# Patient Record
Sex: MALE | Race: WHITE | Employment: OTHER | URBAN - METROPOLITAN AREA
[De-identification: names, ages, dates, MRNs, and addresses within clinical notes are randomized per-mention and may not be internally consistent; named-entity substitution may affect disease eponyms.]

---

## 2017-01-05 ENCOUNTER — OFFICE VISIT (OUTPATIENT)
Dept: INTERNAL MEDICINE | Age: 77
End: 2017-01-05

## 2017-01-05 VITALS
WEIGHT: 193.2 LBS | TEMPERATURE: 98.4 F | RESPIRATION RATE: 16 BRPM | HEART RATE: 69 BPM | BODY MASS INDEX: 27.66 KG/M2 | HEIGHT: 70 IN | OXYGEN SATURATION: 94 % | DIASTOLIC BLOOD PRESSURE: 70 MMHG | SYSTOLIC BLOOD PRESSURE: 148 MMHG

## 2017-01-05 DIAGNOSIS — C61 PROSTATE CANCER (HCC): ICD-10-CM

## 2017-01-05 DIAGNOSIS — I10 ESSENTIAL HYPERTENSION: Primary | ICD-10-CM

## 2017-01-05 DIAGNOSIS — M19.90 ARTHRITIS: ICD-10-CM

## 2017-01-05 DIAGNOSIS — E55.9 HYPOVITAMINOSIS D: ICD-10-CM

## 2017-01-05 LAB
ANION GAP SERPL CALCULATED.3IONS-SCNC: 13 MEQ/L (ref 7–13)
BUN BLDV-MCNC: 22 MG/DL (ref 8–23)
CALCIUM SERPL-MCNC: 9.3 MG/DL (ref 8.6–10.2)
CHLORIDE BLD-SCNC: 101 MEQ/L (ref 98–107)
CO2: 25 MEQ/L (ref 22–29)
CREAT SERPL-MCNC: 1.27 MG/DL (ref 0.7–1.2)
GFR AFRICAN AMERICAN: >60
GFR NON-AFRICAN AMERICAN: 54.7
GLUCOSE BLD-MCNC: 104 MG/DL (ref 74–109)
POTASSIUM SERPL-SCNC: 4.8 MEQ/L (ref 3.5–5.1)
SODIUM BLD-SCNC: 139 MEQ/L (ref 132–144)
VITAMIN D 25-HYDROXY: 70.6 NG/ML (ref 30–100)

## 2017-01-05 PROCEDURE — 99214 OFFICE O/P EST MOD 30 MIN: CPT | Performed by: FAMILY MEDICINE

## 2017-01-05 ASSESSMENT — PATIENT HEALTH QUESTIONNAIRE - PHQ9
SUM OF ALL RESPONSES TO PHQ QUESTIONS 1-9: 0
2. FEELING DOWN, DEPRESSED OR HOPELESS: 0
SUM OF ALL RESPONSES TO PHQ9 QUESTIONS 1 & 2: 0
1. LITTLE INTEREST OR PLEASURE IN DOING THINGS: 0

## 2017-01-06 DIAGNOSIS — N18.30 CRF (CHRONIC RENAL FAILURE), STAGE 3 (MODERATE) (HCC): Primary | ICD-10-CM

## 2017-01-06 PROBLEM — N18.9 CRF (CHRONIC RENAL FAILURE): Status: ACTIVE | Noted: 2017-01-06

## 2017-01-17 ENCOUNTER — HOSPITAL ENCOUNTER (OUTPATIENT)
Dept: ULTRASOUND IMAGING | Age: 77
Discharge: HOME | End: 2017-01-17
Payer: MEDICARE

## 2017-01-17 DIAGNOSIS — N18.30 CRF (CHRONIC RENAL FAILURE), STAGE 3 (MODERATE) (HCC): ICD-10-CM

## 2017-01-17 PROCEDURE — 76775 US EXAM ABDO BACK WALL LIM: CPT

## 2017-04-12 ENCOUNTER — OFFICE VISIT (OUTPATIENT)
Dept: INTERNAL MEDICINE | Age: 77
End: 2017-04-12

## 2017-04-12 VITALS
TEMPERATURE: 98.1 F | WEIGHT: 194 LBS | RESPIRATION RATE: 16 BRPM | SYSTOLIC BLOOD PRESSURE: 140 MMHG | OXYGEN SATURATION: 95 % | HEART RATE: 57 BPM | HEIGHT: 70 IN | DIASTOLIC BLOOD PRESSURE: 76 MMHG | BODY MASS INDEX: 27.77 KG/M2

## 2017-04-12 DIAGNOSIS — I10 ESSENTIAL HYPERTENSION: Primary | ICD-10-CM

## 2017-04-12 DIAGNOSIS — E79.0 HYPERURICEMIA: ICD-10-CM

## 2017-04-12 PROCEDURE — 4040F PNEUMOC VAC/ADMIN/RCVD: CPT | Performed by: FAMILY MEDICINE

## 2017-04-12 PROCEDURE — 1123F ACP DISCUSS/DSCN MKR DOCD: CPT | Performed by: FAMILY MEDICINE

## 2017-04-12 PROCEDURE — G8427 DOCREV CUR MEDS BY ELIG CLIN: HCPCS | Performed by: FAMILY MEDICINE

## 2017-04-12 PROCEDURE — 1036F TOBACCO NON-USER: CPT | Performed by: FAMILY MEDICINE

## 2017-04-12 PROCEDURE — G8420 CALC BMI NORM PARAMETERS: HCPCS | Performed by: FAMILY MEDICINE

## 2017-04-12 PROCEDURE — 99213 OFFICE O/P EST LOW 20 MIN: CPT | Performed by: FAMILY MEDICINE

## 2017-04-12 RX ORDER — PROBENECID 500 MG/1
500 TABLET, FILM COATED ORAL DAILY
Qty: 90 TABLET | Refills: 3 | Status: SHIPPED | OUTPATIENT
Start: 2017-04-12 | End: 2018-04-06 | Stop reason: SDUPTHER

## 2017-04-12 RX ORDER — OMEGA-3S/DHA/EPA/FISH OIL/D3 300MG-1000
400 CAPSULE ORAL DAILY
COMMUNITY
End: 2020-06-15 | Stop reason: DRUGHIGH

## 2017-04-12 RX ORDER — BENAZEPRIL HYDROCHLORIDE 40 MG/1
40 TABLET, FILM COATED ORAL DAILY
Qty: 90 TABLET | Refills: 3 | Status: SHIPPED | OUTPATIENT
Start: 2017-04-12 | End: 2018-04-06 | Stop reason: SDUPTHER

## 2017-05-10 ENCOUNTER — OFFICE VISIT (OUTPATIENT)
Dept: INTERNAL MEDICINE | Age: 77
End: 2017-05-10

## 2017-05-10 VITALS
BODY MASS INDEX: 27.63 KG/M2 | WEIGHT: 193 LBS | DIASTOLIC BLOOD PRESSURE: 66 MMHG | RESPIRATION RATE: 16 BRPM | TEMPERATURE: 98.2 F | HEART RATE: 65 BPM | HEIGHT: 70 IN | SYSTOLIC BLOOD PRESSURE: 130 MMHG | OXYGEN SATURATION: 98 %

## 2017-05-10 DIAGNOSIS — I10 ESSENTIAL HYPERTENSION: Primary | ICD-10-CM

## 2017-05-10 LAB
ANION GAP SERPL CALCULATED.3IONS-SCNC: 15 MEQ/L (ref 7–13)
BUN BLDV-MCNC: 29 MG/DL (ref 8–23)
CALCIUM SERPL-MCNC: 9.3 MG/DL (ref 8.6–10.2)
CHLORIDE BLD-SCNC: 100 MEQ/L (ref 98–107)
CO2: 25 MEQ/L (ref 22–29)
CREAT SERPL-MCNC: 1.45 MG/DL (ref 0.7–1.2)
GFR AFRICAN AMERICAN: 56.8
GFR NON-AFRICAN AMERICAN: 46.9
GLUCOSE BLD-MCNC: 99 MG/DL (ref 74–109)
POTASSIUM SERPL-SCNC: 4.6 MEQ/L (ref 3.5–5.1)
SODIUM BLD-SCNC: 140 MEQ/L (ref 132–144)

## 2017-05-10 PROCEDURE — 99213 OFFICE O/P EST LOW 20 MIN: CPT | Performed by: FAMILY MEDICINE

## 2017-05-10 PROCEDURE — 1123F ACP DISCUSS/DSCN MKR DOCD: CPT | Performed by: FAMILY MEDICINE

## 2017-05-10 PROCEDURE — 4040F PNEUMOC VAC/ADMIN/RCVD: CPT | Performed by: FAMILY MEDICINE

## 2017-05-10 PROCEDURE — 36415 COLL VENOUS BLD VENIPUNCTURE: CPT | Performed by: FAMILY MEDICINE

## 2017-05-10 PROCEDURE — 1036F TOBACCO NON-USER: CPT | Performed by: FAMILY MEDICINE

## 2017-05-10 PROCEDURE — G8427 DOCREV CUR MEDS BY ELIG CLIN: HCPCS | Performed by: FAMILY MEDICINE

## 2017-05-10 PROCEDURE — G8420 CALC BMI NORM PARAMETERS: HCPCS | Performed by: FAMILY MEDICINE

## 2017-08-23 DIAGNOSIS — Z85.46 PERSONAL HISTORY OF MALIGNANT NEOPLASM OF PROSTATE: ICD-10-CM

## 2017-09-11 ENCOUNTER — OFFICE VISIT (OUTPATIENT)
Dept: UROLOGY | Age: 77
End: 2017-09-11

## 2017-09-11 VITALS
BODY MASS INDEX: 27.92 KG/M2 | HEIGHT: 70 IN | HEART RATE: 77 BPM | SYSTOLIC BLOOD PRESSURE: 122 MMHG | DIASTOLIC BLOOD PRESSURE: 62 MMHG | WEIGHT: 195 LBS

## 2017-09-11 DIAGNOSIS — C61 PROSTATE CANCER (HCC): Primary | ICD-10-CM

## 2017-09-11 PROCEDURE — 99213 OFFICE O/P EST LOW 20 MIN: CPT | Performed by: UROLOGY

## 2017-09-11 PROCEDURE — 4040F PNEUMOC VAC/ADMIN/RCVD: CPT | Performed by: UROLOGY

## 2017-09-11 PROCEDURE — G8427 DOCREV CUR MEDS BY ELIG CLIN: HCPCS | Performed by: UROLOGY

## 2017-09-11 PROCEDURE — G8417 CALC BMI ABV UP PARAM F/U: HCPCS | Performed by: UROLOGY

## 2017-09-11 PROCEDURE — 1036F TOBACCO NON-USER: CPT | Performed by: UROLOGY

## 2017-09-11 PROCEDURE — 1123F ACP DISCUSS/DSCN MKR DOCD: CPT | Performed by: UROLOGY

## 2017-12-18 ENCOUNTER — OFFICE VISIT (OUTPATIENT)
Dept: INTERNAL MEDICINE | Age: 77
End: 2017-12-18

## 2017-12-18 VITALS
BODY MASS INDEX: 26.34 KG/M2 | HEIGHT: 70 IN | DIASTOLIC BLOOD PRESSURE: 60 MMHG | TEMPERATURE: 97.8 F | WEIGHT: 184 LBS | SYSTOLIC BLOOD PRESSURE: 120 MMHG | HEART RATE: 65 BPM | RESPIRATION RATE: 16 BRPM | OXYGEN SATURATION: 98 %

## 2017-12-18 DIAGNOSIS — I10 ESSENTIAL HYPERTENSION: Primary | ICD-10-CM

## 2017-12-18 DIAGNOSIS — M1A.09X0 CHRONIC GOUT OF MULTIPLE SITES, UNSPECIFIED CAUSE: ICD-10-CM

## 2017-12-18 PROCEDURE — 1123F ACP DISCUSS/DSCN MKR DOCD: CPT | Performed by: FAMILY MEDICINE

## 2017-12-18 PROCEDURE — G8484 FLU IMMUNIZE NO ADMIN: HCPCS | Performed by: FAMILY MEDICINE

## 2017-12-18 PROCEDURE — G8427 DOCREV CUR MEDS BY ELIG CLIN: HCPCS | Performed by: FAMILY MEDICINE

## 2017-12-18 PROCEDURE — 4040F PNEUMOC VAC/ADMIN/RCVD: CPT | Performed by: FAMILY MEDICINE

## 2017-12-18 PROCEDURE — 99213 OFFICE O/P EST LOW 20 MIN: CPT | Performed by: FAMILY MEDICINE

## 2017-12-18 PROCEDURE — 1036F TOBACCO NON-USER: CPT | Performed by: FAMILY MEDICINE

## 2017-12-18 PROCEDURE — G8417 CALC BMI ABV UP PARAM F/U: HCPCS | Performed by: FAMILY MEDICINE

## 2017-12-18 NOTE — PROGRESS NOTES
Patient: Vinny Mahajan    YOB: 1940    Date: 12/18/17    Chief Complaint   Patient presents with    Hypertension     6 month follow up. He is doing well. Patient Active Problem List    Diagnosis Date Noted    Essential hypertension 05/10/2017    CRF (chronic renal failure) 01/06/2017    Chronic kidney disease, stage III (moderate)     Prostate cancer (HCC)      Overview Note:     Radiation therapy      Gout     Arthritis        Allergies   Allergen Reactions    Altace [Ramipril]     Ramipril Other (See Comments)    Uloric [Febuxostat] Other (See Comments)       Vitals:    12/18/17 1453   BP: 120/60   Site: Left Arm   Position: Sitting   Cuff Size: Large Adult   Pulse: 65   Resp: 16   Temp: 97.8 °F (36.6 °C)   TempSrc: Oral   SpO2: 98%   Weight: 184 lb (83.5 kg)   Height: 5' 10\" (1.778 m)     Body mass index is 26.4 kg/m². HPI    He feels well the stressors life is high as he's had a grandchild moved back in with him. He denies any cough fever chills chest pain nausea vomiting and his gout is been quiet. He recently saw his urologist and his PSA tested quite low. Review of Systems    Constitutional: Negative for fatigue, fever and sweats. HEENT: Negative for eye discharge and vision loss. Negative for ear drainage, hearing loss and nasal drainage. Respiratory: Negative for cough, dyspnea and wheezing. Cardiovascular:  Negative for chest pain, claudication and irregular heartbeat/palpitations. Gastrointestinal: Negative for abdominal pain, nausea, vomiting, constipation and diarrhea. Genitourinary: No dysuria or penile discharge. Metabolic/Endocrine: Negative for cold intolerance, heat intolerance, polydipsia and polyphagia. No unintended weight loss or gain. Neuro/Psychiatric: Negative for gait disturbance. Negative for psychiatric symptoms. Dermatologic: Negative for pruritus and rash. Musculoskeletal: Negative for bone/joint symptoms. No numbness or tingling.

## 2017-12-19 LAB
ANION GAP SERPL CALCULATED.3IONS-SCNC: 16 MEQ/L (ref 7–13)
BUN BLDV-MCNC: 26 MG/DL (ref 8–23)
CALCIUM SERPL-MCNC: 9.2 MG/DL (ref 8.6–10.2)
CHLORIDE BLD-SCNC: 103 MEQ/L (ref 98–107)
CO2: 24 MEQ/L (ref 22–29)
CREAT SERPL-MCNC: 1.37 MG/DL (ref 0.7–1.2)
GFR AFRICAN AMERICAN: >60
GFR NON-AFRICAN AMERICAN: 50
GLUCOSE BLD-MCNC: 93 MG/DL (ref 74–109)
POTASSIUM SERPL-SCNC: 4.7 MEQ/L (ref 3.5–5.1)
SODIUM BLD-SCNC: 143 MEQ/L (ref 132–144)
URIC ACID, SERUM: 6.6 MG/DL (ref 3.4–7)

## 2018-04-06 DIAGNOSIS — E79.0 HYPERURICEMIA: ICD-10-CM

## 2018-04-09 RX ORDER — BENAZEPRIL HYDROCHLORIDE 40 MG/1
40 TABLET, FILM COATED ORAL DAILY
Qty: 90 TABLET | Refills: 3 | Status: SHIPPED | OUTPATIENT
Start: 2018-04-09 | End: 2019-02-25 | Stop reason: SDUPTHER

## 2018-04-09 RX ORDER — PROBENECID 500 MG/1
500 TABLET, FILM COATED ORAL DAILY
Qty: 90 TABLET | Refills: 3 | Status: SHIPPED | OUTPATIENT
Start: 2018-04-09 | End: 2019-03-04 | Stop reason: SDUPTHER

## 2018-06-25 ENCOUNTER — OFFICE VISIT (OUTPATIENT)
Dept: INTERNAL MEDICINE CLINIC | Age: 78
End: 2018-06-25
Payer: MEDICARE

## 2018-06-25 VITALS
DIASTOLIC BLOOD PRESSURE: 64 MMHG | HEIGHT: 70 IN | BODY MASS INDEX: 25.51 KG/M2 | WEIGHT: 178.2 LBS | SYSTOLIC BLOOD PRESSURE: 130 MMHG | HEART RATE: 57 BPM | TEMPERATURE: 97.8 F | OXYGEN SATURATION: 95 %

## 2018-06-25 DIAGNOSIS — N18.30 CHRONIC KIDNEY DISEASE, STAGE III (MODERATE) (HCC): ICD-10-CM

## 2018-06-25 DIAGNOSIS — I10 ESSENTIAL HYPERTENSION: Primary | ICD-10-CM

## 2018-06-25 DIAGNOSIS — Z23 NEED FOR TDAP VACCINATION: ICD-10-CM

## 2018-06-25 PROBLEM — N18.9 CRF (CHRONIC RENAL FAILURE): Status: RESOLVED | Noted: 2017-01-06 | Resolved: 2018-06-25

## 2018-06-25 PROCEDURE — 4040F PNEUMOC VAC/ADMIN/RCVD: CPT | Performed by: FAMILY MEDICINE

## 2018-06-25 PROCEDURE — 1036F TOBACCO NON-USER: CPT | Performed by: FAMILY MEDICINE

## 2018-06-25 PROCEDURE — 1123F ACP DISCUSS/DSCN MKR DOCD: CPT | Performed by: FAMILY MEDICINE

## 2018-06-25 PROCEDURE — 99213 OFFICE O/P EST LOW 20 MIN: CPT | Performed by: FAMILY MEDICINE

## 2018-06-25 PROCEDURE — G8427 DOCREV CUR MEDS BY ELIG CLIN: HCPCS | Performed by: FAMILY MEDICINE

## 2018-06-25 PROCEDURE — G8417 CALC BMI ABV UP PARAM F/U: HCPCS | Performed by: FAMILY MEDICINE

## 2018-06-25 ASSESSMENT — PATIENT HEALTH QUESTIONNAIRE - PHQ9
SUM OF ALL RESPONSES TO PHQ9 QUESTIONS 1 & 2: 0
1. LITTLE INTEREST OR PLEASURE IN DOING THINGS: 0
2. FEELING DOWN, DEPRESSED OR HOPELESS: 0
SUM OF ALL RESPONSES TO PHQ QUESTIONS 1-9: 0

## 2018-06-27 DIAGNOSIS — N18.30 CHRONIC KIDNEY DISEASE, STAGE III (MODERATE) (HCC): ICD-10-CM

## 2018-06-27 DIAGNOSIS — I10 ESSENTIAL HYPERTENSION: ICD-10-CM

## 2018-06-27 LAB
ANION GAP SERPL CALCULATED.3IONS-SCNC: 13 MEQ/L (ref 7–13)
BUN BLDV-MCNC: 22 MG/DL (ref 8–23)
CALCIUM SERPL-MCNC: 9.5 MG/DL (ref 8.6–10.2)
CHLORIDE BLD-SCNC: 101 MEQ/L (ref 98–107)
CO2: 29 MEQ/L (ref 22–29)
CREAT SERPL-MCNC: 1.37 MG/DL (ref 0.7–1.2)
GFR AFRICAN AMERICAN: >60
GFR NON-AFRICAN AMERICAN: 49.9
GLUCOSE BLD-MCNC: 73 MG/DL (ref 74–109)
POTASSIUM SERPL-SCNC: 4.3 MEQ/L (ref 3.5–5.1)
SODIUM BLD-SCNC: 143 MEQ/L (ref 132–144)

## 2018-09-06 DIAGNOSIS — C61 PROSTATE CANCER (HCC): ICD-10-CM

## 2018-09-06 LAB — PROSTATE SPECIFIC ANTIGEN: 0.97 NG/ML (ref 0–6.22)

## 2018-09-13 ENCOUNTER — OFFICE VISIT (OUTPATIENT)
Dept: UROLOGY | Age: 78
End: 2018-09-13
Payer: MEDICARE

## 2018-09-13 VITALS
SYSTOLIC BLOOD PRESSURE: 132 MMHG | BODY MASS INDEX: 27.2 KG/M2 | DIASTOLIC BLOOD PRESSURE: 66 MMHG | HEIGHT: 70 IN | HEART RATE: 66 BPM | WEIGHT: 190 LBS

## 2018-09-13 DIAGNOSIS — C61 PROSTATE CANCER (HCC): Primary | ICD-10-CM

## 2018-09-13 PROCEDURE — 4040F PNEUMOC VAC/ADMIN/RCVD: CPT | Performed by: UROLOGY

## 2018-09-13 PROCEDURE — 99213 OFFICE O/P EST LOW 20 MIN: CPT | Performed by: UROLOGY

## 2018-09-13 PROCEDURE — G8427 DOCREV CUR MEDS BY ELIG CLIN: HCPCS | Performed by: UROLOGY

## 2018-09-13 PROCEDURE — 1101F PT FALLS ASSESS-DOCD LE1/YR: CPT | Performed by: UROLOGY

## 2018-09-13 PROCEDURE — 1036F TOBACCO NON-USER: CPT | Performed by: UROLOGY

## 2018-09-13 PROCEDURE — 1123F ACP DISCUSS/DSCN MKR DOCD: CPT | Performed by: UROLOGY

## 2018-09-13 PROCEDURE — G8417 CALC BMI ABV UP PARAM F/U: HCPCS | Performed by: UROLOGY
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
## 2018-12-26 ENCOUNTER — OFFICE VISIT (OUTPATIENT)
Dept: INTERNAL MEDICINE CLINIC | Age: 78
End: 2018-12-26
Payer: MEDICARE

## 2018-12-26 VITALS
BODY MASS INDEX: 25.34 KG/M2 | HEIGHT: 70 IN | DIASTOLIC BLOOD PRESSURE: 66 MMHG | SYSTOLIC BLOOD PRESSURE: 130 MMHG | WEIGHT: 177 LBS | TEMPERATURE: 98 F | RESPIRATION RATE: 18 BRPM | OXYGEN SATURATION: 97 % | HEART RATE: 82 BPM

## 2018-12-26 DIAGNOSIS — J06.9 UPPER RESPIRATORY TRACT INFECTION, UNSPECIFIED TYPE: ICD-10-CM

## 2018-12-26 DIAGNOSIS — M10.9 ACUTE GOUT OF LEFT FOOT, UNSPECIFIED CAUSE: Primary | ICD-10-CM

## 2018-12-26 DIAGNOSIS — N18.30 CHRONIC KIDNEY DISEASE, STAGE III (MODERATE) (HCC): ICD-10-CM

## 2018-12-26 DIAGNOSIS — I10 ESSENTIAL HYPERTENSION: ICD-10-CM

## 2018-12-26 PROCEDURE — 1101F PT FALLS ASSESS-DOCD LE1/YR: CPT | Performed by: FAMILY MEDICINE

## 2018-12-26 PROCEDURE — G8427 DOCREV CUR MEDS BY ELIG CLIN: HCPCS | Performed by: FAMILY MEDICINE

## 2018-12-26 PROCEDURE — 1123F ACP DISCUSS/DSCN MKR DOCD: CPT | Performed by: FAMILY MEDICINE

## 2018-12-26 PROCEDURE — 4040F PNEUMOC VAC/ADMIN/RCVD: CPT | Performed by: FAMILY MEDICINE

## 2018-12-26 PROCEDURE — 99214 OFFICE O/P EST MOD 30 MIN: CPT | Performed by: FAMILY MEDICINE

## 2018-12-26 PROCEDURE — G8417 CALC BMI ABV UP PARAM F/U: HCPCS | Performed by: FAMILY MEDICINE

## 2018-12-26 PROCEDURE — G8482 FLU IMMUNIZE ORDER/ADMIN: HCPCS | Performed by: FAMILY MEDICINE

## 2018-12-26 PROCEDURE — 1036F TOBACCO NON-USER: CPT | Performed by: FAMILY MEDICINE

## 2018-12-26 RX ORDER — METHYLPREDNISOLONE 4 MG/1
TABLET ORAL
Qty: 1 KIT | Refills: 0 | Status: SHIPPED | OUTPATIENT
Start: 2018-12-26 | End: 2019-06-26 | Stop reason: ALTCHOICE

## 2018-12-26 RX ORDER — AMOXICILLIN 500 MG/1
500 CAPSULE ORAL 3 TIMES DAILY
Qty: 30 CAPSULE | Refills: 0 | Status: SHIPPED | OUTPATIENT
Start: 2018-12-26 | End: 2019-01-05

## 2018-12-26 NOTE — PROGRESS NOTES
heartbeat/palpitations. Gastrointestinal: Negative for abdominal pain, nausea, vomiting, constipation and diarrhea. Genitourinary: No dysuria or penile discharge. Metabolic/Endocrine: Negative for cold intolerance, heat intolerance, polydipsia and polyphagia. No unintended weight loss or gain. Neuro/Psychiatric: Negative for gait disturbance. Negative for psychiatric symptoms. Dermatologic: Negative for pruritus and rash. Musculoskeletal: Positive for bone/joint symptoms, left foot. No numbness or tingling. No weakness. Hematology: Positive for bleeding and easy bruising. Immunology:  Negative for environmental allergies and food allergies. Physical Exam    Patient's medication, allergies, past medical, surgical, social and family histories were reviewed and updated as appropriate. PHYSICAL EXAM   General appearance: Alert oriented pleasant cooperative in no acute distress. HEENT: Eyes clear nonicteric facial muscles symmetrical.,  TMs and canals normal no erythema, oropharynx clear no erythema or exudates neck slight neck: Soft nontender no adenopathy  Lungs: Clear to auscultation no wheezes rhonchi or rales  Heart: Regular rate and rhythm without murmurs rubs or gallops  Extremities: Left foot at the base of the large toe is erythema and tenderness otherwise the foot is unremarkable skin is intact. Assessment:   Diagnosis Orders   1. Acute gout of left foot, unspecified cause  Uric Acid    CBC Auto Differential   2. Essential hypertension  Comprehensive Metabolic Panel  Control    3. Upper respiratory tract infection, unspecified type  amoxicillin (AMOXIL) 500 MG capsule   4.  Chronic kidney disease, stage III (moderate) ([facility])  Comprehensive Metabolic Panel    methylPREDNISolone (MEDROL, SUSHMA,) 4 MG tablet               Plan:  Current Outpatient Prescriptions   Medication Sig Dispense Refill    methylPREDNISolone (MEDROL, SUSHMA,) 4 MG tablet Take as directed 1 kit 0    amoxicillin (AMOXIL)

## 2018-12-27 DIAGNOSIS — I10 ESSENTIAL HYPERTENSION: ICD-10-CM

## 2018-12-27 DIAGNOSIS — M10.9 ACUTE GOUT OF LEFT FOOT, UNSPECIFIED CAUSE: ICD-10-CM

## 2018-12-27 DIAGNOSIS — N18.30 CHRONIC KIDNEY DISEASE, STAGE III (MODERATE) (HCC): ICD-10-CM

## 2018-12-27 LAB
ALBUMIN SERPL-MCNC: 3.6 G/DL (ref 3.9–4.9)
ALP BLD-CCNC: 62 U/L (ref 35–104)
ALT SERPL-CCNC: 16 U/L (ref 0–41)
ANION GAP SERPL CALCULATED.3IONS-SCNC: 14 MEQ/L (ref 7–13)
AST SERPL-CCNC: 19 U/L (ref 0–40)
BASOPHILS ABSOLUTE: 0.1 K/UL (ref 0–0.2)
BASOPHILS RELATIVE PERCENT: 0.6 %
BILIRUB SERPL-MCNC: 0.7 MG/DL (ref 0–1.2)
BUN BLDV-MCNC: 26 MG/DL (ref 8–23)
CALCIUM SERPL-MCNC: 9.7 MG/DL (ref 8.6–10.2)
CHLORIDE BLD-SCNC: 98 MEQ/L (ref 98–107)
CO2: 28 MEQ/L (ref 22–29)
CREAT SERPL-MCNC: 1.26 MG/DL (ref 0.7–1.2)
EOSINOPHILS ABSOLUTE: 0.1 K/UL (ref 0–0.7)
EOSINOPHILS RELATIVE PERCENT: 1 %
GFR AFRICAN AMERICAN: >60
GFR NON-AFRICAN AMERICAN: 54.9
GLOBULIN: 4.2 G/DL (ref 2.3–3.5)
GLUCOSE BLD-MCNC: 106 MG/DL (ref 74–109)
HCT VFR BLD CALC: 44.6 % (ref 42–52)
HEMOGLOBIN: 14.9 G/DL (ref 14–18)
LYMPHOCYTES ABSOLUTE: 0.8 K/UL (ref 1–4.8)
LYMPHOCYTES RELATIVE PERCENT: 8.7 %
MCH RBC QN AUTO: 30.8 PG (ref 27–31.3)
MCHC RBC AUTO-ENTMCNC: 33.4 % (ref 33–37)
MCV RBC AUTO: 92.3 FL (ref 80–100)
MONOCYTES ABSOLUTE: 0.8 K/UL (ref 0.2–0.8)
MONOCYTES RELATIVE PERCENT: 8.9 %
NEUTROPHILS ABSOLUTE: 7.1 K/UL (ref 1.4–6.5)
NEUTROPHILS RELATIVE PERCENT: 80.8 %
PDW BLD-RTO: 13.9 % (ref 11.5–14.5)
PLATELET # BLD: 357 K/UL (ref 130–400)
POTASSIUM SERPL-SCNC: 4.6 MEQ/L (ref 3.5–5.1)
RBC # BLD: 4.83 M/UL (ref 4.7–6.1)
SODIUM BLD-SCNC: 140 MEQ/L (ref 132–144)
TOTAL PROTEIN: 7.8 G/DL (ref 6.4–8.1)
URIC ACID, SERUM: 6.9 MG/DL (ref 3.4–7)
WBC # BLD: 8.8 K/UL (ref 4.8–10.8)

## 2019-01-02 ENCOUNTER — TELEPHONE (OUTPATIENT)
Dept: INTERNAL MEDICINE CLINIC | Age: 79
End: 2019-01-02

## 2019-02-25 RX ORDER — BENAZEPRIL HYDROCHLORIDE 40 MG/1
40 TABLET, FILM COATED ORAL DAILY
Qty: 90 TABLET | Refills: 3 | Status: SHIPPED | OUTPATIENT
Start: 2019-02-25 | End: 2020-03-23

## 2019-06-26 ENCOUNTER — OFFICE VISIT (OUTPATIENT)
Dept: FAMILY MEDICINE CLINIC | Age: 79
End: 2019-06-26
Payer: MEDICARE

## 2019-06-26 VITALS
SYSTOLIC BLOOD PRESSURE: 110 MMHG | TEMPERATURE: 98.2 F | HEIGHT: 70 IN | DIASTOLIC BLOOD PRESSURE: 70 MMHG | WEIGHT: 179 LBS | OXYGEN SATURATION: 98 % | BODY MASS INDEX: 25.62 KG/M2 | HEART RATE: 62 BPM

## 2019-06-26 DIAGNOSIS — I10 ESSENTIAL HYPERTENSION: Primary | ICD-10-CM

## 2019-06-26 DIAGNOSIS — I10 ESSENTIAL HYPERTENSION: ICD-10-CM

## 2019-06-26 LAB
ANION GAP SERPL CALCULATED.3IONS-SCNC: 15 MEQ/L (ref 9–15)
BUN BLDV-MCNC: 26 MG/DL (ref 8–23)
CALCIUM SERPL-MCNC: 9.3 MG/DL (ref 8.5–9.9)
CHLORIDE BLD-SCNC: 101 MEQ/L (ref 95–107)
CO2: 24 MEQ/L (ref 20–31)
CREAT SERPL-MCNC: 1.5 MG/DL (ref 0.7–1.2)
GFR AFRICAN AMERICAN: 54.3
GFR NON-AFRICAN AMERICAN: 44.9
GLUCOSE BLD-MCNC: 97 MG/DL (ref 70–99)
POTASSIUM SERPL-SCNC: 4.6 MEQ/L (ref 3.4–4.9)
SODIUM BLD-SCNC: 140 MEQ/L (ref 135–144)

## 2019-06-26 PROCEDURE — 1123F ACP DISCUSS/DSCN MKR DOCD: CPT | Performed by: FAMILY MEDICINE

## 2019-06-26 PROCEDURE — 99213 OFFICE O/P EST LOW 20 MIN: CPT | Performed by: FAMILY MEDICINE

## 2019-06-26 PROCEDURE — 4040F PNEUMOC VAC/ADMIN/RCVD: CPT | Performed by: FAMILY MEDICINE

## 2019-06-26 PROCEDURE — G8427 DOCREV CUR MEDS BY ELIG CLIN: HCPCS | Performed by: FAMILY MEDICINE

## 2019-06-26 PROCEDURE — G8417 CALC BMI ABV UP PARAM F/U: HCPCS | Performed by: FAMILY MEDICINE

## 2019-06-26 PROCEDURE — 1036F TOBACCO NON-USER: CPT | Performed by: FAMILY MEDICINE

## 2019-06-26 ASSESSMENT — PATIENT HEALTH QUESTIONNAIRE - PHQ9
SUM OF ALL RESPONSES TO PHQ QUESTIONS 1-9: 0
1. LITTLE INTEREST OR PLEASURE IN DOING THINGS: 0
SUM OF ALL RESPONSES TO PHQ QUESTIONS 1-9: 0
SUM OF ALL RESPONSES TO PHQ9 QUESTIONS 1 & 2: 0
2. FEELING DOWN, DEPRESSED OR HOPELESS: 0

## 2019-06-26 NOTE — PROGRESS NOTES
No numbness or tingling. No weakness. Hematology: Negative for bleeding and easy bruising. Immunology:  Negative for environmental allergies and food allergies. Physical Exam    Patient's medication, allergies, past medical, surgical, social and family histories were reviewed and updated as appropriate. PHYSICAL EXAM   General appearance: Alert oriented pleasant cooperative no acute distress. HEENT: Eyes clear nonicteric facial muscles symmetrical.  Hearing good  Neck: Soft nontender no adenopathy or masses no carotid bruits  Lungs: Clear to auscultation without wheezes rhonchi or rales  Heart: Regular rate and rhythm no murmurs rubs or gallops  Extremities: He is ZEINA hose on the right leg but there is no calf tenderness he reports there is no skin breakdown left leg unremarkable grossly moving all extremities equally and has normal gait. Assessment:   Diagnosis Orders   1. Essential hypertension  Basic Metabolic Panel               Plan:  Current Outpatient Medications   Medication Sig Dispense Refill    probenecid (BENEMID) 500 MG tablet TAKE 1 TABLET BY MOUTH  DAILY 90 tablet 0    benazepril (LOTENSIN) 40 MG tablet TAKE 1 TABLET BY MOUTH  DAILY 90 tablet 3    vitamin D3 (CHOLECALCIFEROL) 400 UNITS TABS tablet Take 400 Units by mouth daily Indications: Takes 2 capules daily       No current facility-administered medications for this visit. Orders Placed This Encounter   Procedures    Basic Metabolic Panel     Standing Status:   Future     Number of Occurrences:   1     Standing Expiration Date:   6/25/2020       No orders of the defined types were placed in this encounter. Return in about 6 months (around 12/26/2019).     Dr. Cline Pay      6/26/19  1:24 PM

## 2019-07-13 ENCOUNTER — APPOINTMENT (OUTPATIENT)
Dept: GENERAL RADIOLOGY | Age: 79
End: 2019-07-13
Payer: MEDICARE

## 2019-07-13 ENCOUNTER — HOSPITAL ENCOUNTER (EMERGENCY)
Age: 79
Discharge: HOME | End: 2019-07-13
Attending: EMERGENCY MEDICINE
Payer: MEDICARE
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
## 2019-07-13 LAB
ALBUMIN SERPL-MCNC: 3.8 G/DL (ref 3.5–4.6)
ALP BLD-CCNC: 54 U/L (ref 35–104)
ALT SERPL-CCNC: 10 U/L (ref 0–41)
ANION GAP SERPL CALCULATED.3IONS-SCNC: 17 MEQ/L (ref 9–15)
AST SERPL-CCNC: 17 U/L (ref 0–40)
BASOPHILS ABSOLUTE: 0 K/UL (ref 0–0.2)
BASOPHILS RELATIVE PERCENT: 0.7 %
BILIRUB SERPL-MCNC: 0.3 MG/DL (ref 0.2–0.7)
BUN BLDV-MCNC: 31 MG/DL (ref 8–23)
CALCIUM SERPL-MCNC: 8.7 MG/DL (ref 8.5–9.9)
CHLORIDE BLD-SCNC: 99 MEQ/L (ref 95–107)
CO2: 22 MEQ/L (ref 20–31)
CREAT SERPL-MCNC: 1.98 MG/DL (ref 0.7–1.2)
EKG ATRIAL RATE: 70 BPM
EKG P AXIS: 86 DEGREES
EKG P-R INTERVAL: 246 MS
EKG Q-T INTERVAL: 398 MS
EKG QRS DURATION: 86 MS
EKG QTC CALCULATION (BAZETT): 429 MS
EKG R AXIS: 27 DEGREES
EKG T AXIS: 41 DEGREES
EKG VENTRICULAR RATE: 70 BPM
EOSINOPHILS ABSOLUTE: 0.2 K/UL (ref 0–0.7)
EOSINOPHILS RELATIVE PERCENT: 3.9 %
GFR AFRICAN AMERICAN: 39.4
GFR NON-AFRICAN AMERICAN: 32.6
GLOBULIN: 2.3 G/DL (ref 2.3–3.5)
GLUCOSE BLD-MCNC: 127 MG/DL (ref 70–99)
HCT VFR BLD CALC: 39.9 % (ref 42–52)
HEMOGLOBIN: 13.7 G/DL (ref 14–18)
LYMPHOCYTES ABSOLUTE: 2.3 K/UL (ref 1–4.8)
LYMPHOCYTES RELATIVE PERCENT: 36.6 %
MCH RBC QN AUTO: 30.8 PG (ref 27–31.3)
MCHC RBC AUTO-ENTMCNC: 34.4 % (ref 33–37)
MCV RBC AUTO: 89.7 FL (ref 80–100)
MONOCYTES ABSOLUTE: 0.9 K/UL (ref 0.2–0.8)
MONOCYTES RELATIVE PERCENT: 13.9 %
NEUTROPHILS ABSOLUTE: 2.8 K/UL (ref 1.4–6.5)
NEUTROPHILS RELATIVE PERCENT: 44.9 %
PDW BLD-RTO: 13.7 % (ref 11.5–14.5)
PLATELET # BLD: 168 K/UL (ref 130–400)
POTASSIUM SERPL-SCNC: 4.2 MEQ/L (ref 3.4–4.9)
RBC # BLD: 4.45 M/UL (ref 4.7–6.1)
SODIUM BLD-SCNC: 138 MEQ/L (ref 135–144)
TOTAL PROTEIN: 6.1 G/DL (ref 6.3–8)
TROPONIN: <0.01 NG/ML (ref 0–0.01)
WBC # BLD: 6.3 K/UL (ref 4.8–10.8)

## 2019-07-13 PROCEDURE — 99285 EMERGENCY DEPT VISIT HI MDM: CPT

## 2019-07-13 PROCEDURE — 80053 COMPREHEN METABOLIC PANEL: CPT

## 2019-07-13 PROCEDURE — 6830039000 HC L3 TRAUMA ALERT

## 2019-07-13 PROCEDURE — 84484 ASSAY OF TROPONIN QUANT: CPT

## 2019-07-13 PROCEDURE — 85025 COMPLETE CBC W/AUTO DIFF WBC: CPT

## 2019-07-13 PROCEDURE — 93005 ELECTROCARDIOGRAM TRACING: CPT

## 2019-07-13 PROCEDURE — 73030 X-RAY EXAM OF SHOULDER: CPT

## 2019-07-13 RX ORDER — KETOROLAC TROMETHAMINE 15 MG/ML
INJECTION, SOLUTION INTRAMUSCULAR; INTRAVENOUS
Status: DISPENSED
Start: 2019-07-13 | End: 2019-07-13

## 2019-07-13 ASSESSMENT — ENCOUNTER SYMPTOMS
SHORTNESS OF BREATH: 0
ABDOMINAL PAIN: 0
EYE DISCHARGE: 0
VOMITING: 0
CHEST TIGHTNESS: 0
COUGH: 0
ABDOMINAL DISTENTION: 0
PHOTOPHOBIA: 0
WHEEZING: 0
SORE THROAT: 0

## 2019-07-15 DIAGNOSIS — E79.0 HYPERURICEMIA: ICD-10-CM

## 2019-07-15 RX ORDER — PROBENECID 500 MG/1
500 TABLET, FILM COATED ORAL DAILY
Qty: 90 TABLET | Refills: 3 | Status: SHIPPED | OUTPATIENT
Start: 2019-07-15 | End: 2020-04-06 | Stop reason: SDUPTHER

## 2019-07-16 ENCOUNTER — OFFICE VISIT (OUTPATIENT)
Dept: FAMILY MEDICINE CLINIC | Age: 79
End: 2019-07-16
Payer: MEDICARE

## 2019-07-16 VITALS
TEMPERATURE: 97.8 F | WEIGHT: 179.8 LBS | BODY MASS INDEX: 25.74 KG/M2 | OXYGEN SATURATION: 98 % | SYSTOLIC BLOOD PRESSURE: 128 MMHG | RESPIRATION RATE: 16 BRPM | HEART RATE: 65 BPM | HEIGHT: 70 IN | DIASTOLIC BLOOD PRESSURE: 68 MMHG

## 2019-07-16 DIAGNOSIS — I10 ESSENTIAL HYPERTENSION: ICD-10-CM

## 2019-07-16 DIAGNOSIS — R31.0 GROSS HEMATURIA: Primary | ICD-10-CM

## 2019-07-16 DIAGNOSIS — Z86.010 H/O ADENOMATOUS POLYP OF COLON: ICD-10-CM

## 2019-07-16 DIAGNOSIS — Z85.46 H/O PROSTATE CANCER: ICD-10-CM

## 2019-07-16 DIAGNOSIS — D64.9 ANEMIA, UNSPECIFIED TYPE: ICD-10-CM

## 2019-07-16 PROCEDURE — 1123F ACP DISCUSS/DSCN MKR DOCD: CPT | Performed by: PHYSICIAN ASSISTANT

## 2019-07-16 PROCEDURE — 4040F PNEUMOC VAC/ADMIN/RCVD: CPT | Performed by: PHYSICIAN ASSISTANT

## 2019-07-16 PROCEDURE — 1036F TOBACCO NON-USER: CPT | Performed by: PHYSICIAN ASSISTANT

## 2019-07-16 PROCEDURE — G8417 CALC BMI ABV UP PARAM F/U: HCPCS | Performed by: PHYSICIAN ASSISTANT

## 2019-07-16 PROCEDURE — 99214 OFFICE O/P EST MOD 30 MIN: CPT | Performed by: PHYSICIAN ASSISTANT

## 2019-07-16 PROCEDURE — G8427 DOCREV CUR MEDS BY ELIG CLIN: HCPCS | Performed by: PHYSICIAN ASSISTANT

## 2019-07-16 ASSESSMENT — ENCOUNTER SYMPTOMS
COUGH: 0
NAUSEA: 0
VOMITING: 0
EYE PAIN: 0
BACK PAIN: 0
DIARRHEA: 0
SHORTNESS OF BREATH: 0
CONSTIPATION: 0
SORE THROAT: 0

## 2019-07-16 NOTE — PROGRESS NOTES
Subjective  Raquel Herrmann, 78 y.o. male presents today with:  Chief Complaint   Patient presents with    Follow-Up from Hospital     Patient following up after being seen at [address] [facility] ER 7/13 for a fall at home. Patient had labs EKG and xray of left shoulder done. Xray was normal and patient was discharged home the same day. Patient states his shoulder is alittle sore but he is doing okay. GERALDO Mcknight MD is here for hospital follow-up for left shoulder pain 7/13/2019 states he fell out of the bed during sleep was disoriented and diaphoretic. Taken by ambulance to [address] [facility] labs were normal x-ray negative for fracture. As he was told he was hypotensive-however in review of the notes he was normotensive. Not having any shoulder pain today he has degenerative changes of the knees has mild discomfort  He does report he had been golfing for several hours and did not drink. He also had an episode of gross hematuria approx a month ago. No reocurence no dysuria. Denies rectal bleeding  History of prostate cancer 10 years ago ( followed by albaro an) colonoscopy 2013+ for internal hemorrhoids and colonic polyps  Review of Systems   Constitutional: Negative for fatigue. HENT: Negative for congestion, ear pain and sore throat. Eyes: Negative for pain. Respiratory: Negative for cough and shortness of breath. Gastrointestinal: Negative for constipation, diarrhea, nausea and vomiting. Genitourinary: Negative for dysuria. Musculoskeletal: Negative for back pain and neck pain. Allergic/Immunologic: Negative for environmental allergies and food allergies. Neurological: Negative for dizziness and headaches.          Past Medical History:   Diagnosis Date    Adenomatous polyp     Allergic rhinitis     Arthritis     of the first and the second MP joints of the Right foot     Basal cell carcinoma of nose 04/28/15    Chronic kidney disease, stage III (moderate) (HCC)     Colon polyps     Edema     Chronic

## 2019-07-17 ENCOUNTER — NURSE ONLY (OUTPATIENT)
Dept: FAMILY MEDICINE CLINIC | Age: 79
End: 2019-07-17
Payer: MEDICARE

## 2019-07-17 ENCOUNTER — OFFICE VISIT (OUTPATIENT)
Dept: FAMILY MEDICINE CLINIC | Age: 79
End: 2019-07-17
Payer: MEDICARE

## 2019-07-17 DIAGNOSIS — D64.9 ANEMIA, UNSPECIFIED TYPE: ICD-10-CM

## 2019-07-17 DIAGNOSIS — R31.0 GROSS HEMATURIA: ICD-10-CM

## 2019-07-17 LAB
BILIRUBIN URINE: NEGATIVE
BLOOD, URINE: NEGATIVE
CLARITY: CLEAR
COLOR: YELLOW
CONTROL: PRESENT
GLUCOSE URINE: NEGATIVE MG/DL
HEMOCCULT STL QL: NEGATIVE
KETONES, URINE: NEGATIVE MG/DL
LEUKOCYTE ESTERASE, URINE: NEGATIVE
NITRITE, URINE: NEGATIVE
PH UA: 6.5 (ref 5–9)
PROTEIN UA: NEGATIVE MG/DL
SPECIFIC GRAVITY UA: 1.01 (ref 1–1.03)
UROBILINOGEN, URINE: 0.2 E.U./DL

## 2019-07-17 PROCEDURE — 82274 ASSAY TEST FOR BLOOD FECAL: CPT | Performed by: PHYSICIAN ASSISTANT

## 2019-09-06 DIAGNOSIS — C61 PROSTATE CANCER (HCC): ICD-10-CM

## 2019-09-06 LAB — PROSTATE SPECIFIC ANTIGEN: 1.3 NG/ML (ref 0–6.22)

## 2019-09-13 ENCOUNTER — OFFICE VISIT (OUTPATIENT)
Dept: UROLOGY | Age: 79
End: 2019-09-13
Payer: MEDICARE

## 2019-09-13 VITALS
BODY MASS INDEX: 25.77 KG/M2 | HEART RATE: 63 BPM | SYSTOLIC BLOOD PRESSURE: 118 MMHG | WEIGHT: 180 LBS | DIASTOLIC BLOOD PRESSURE: 62 MMHG | HEIGHT: 70 IN

## 2019-09-13 DIAGNOSIS — C61 PROSTATE CANCER (HCC): Primary | ICD-10-CM

## 2019-09-13 PROCEDURE — 1123F ACP DISCUSS/DSCN MKR DOCD: CPT | Performed by: UROLOGY

## 2019-09-13 PROCEDURE — 1036F TOBACCO NON-USER: CPT | Performed by: UROLOGY

## 2019-09-13 PROCEDURE — G8417 CALC BMI ABV UP PARAM F/U: HCPCS | Performed by: UROLOGY

## 2019-09-13 PROCEDURE — G8427 DOCREV CUR MEDS BY ELIG CLIN: HCPCS | Performed by: UROLOGY

## 2019-09-13 PROCEDURE — 4040F PNEUMOC VAC/ADMIN/RCVD: CPT | Performed by: UROLOGY

## 2019-09-13 PROCEDURE — 99213 OFFICE O/P EST LOW 20 MIN: CPT | Performed by: UROLOGY

## 2019-12-16 ENCOUNTER — OFFICE VISIT (OUTPATIENT)
Dept: FAMILY MEDICINE CLINIC | Age: 79
End: 2019-12-16
Payer: MEDICARE

## 2019-12-16 VITALS
HEART RATE: 69 BPM | OXYGEN SATURATION: 99 % | DIASTOLIC BLOOD PRESSURE: 70 MMHG | BODY MASS INDEX: 26.48 KG/M2 | SYSTOLIC BLOOD PRESSURE: 138 MMHG | HEIGHT: 70 IN | WEIGHT: 185 LBS | RESPIRATION RATE: 16 BRPM | TEMPERATURE: 97.7 F

## 2019-12-16 DIAGNOSIS — Z00.00 ROUTINE GENERAL MEDICAL EXAMINATION AT A HEALTH CARE FACILITY: Primary | ICD-10-CM

## 2019-12-16 PROCEDURE — G8482 FLU IMMUNIZE ORDER/ADMIN: HCPCS | Performed by: FAMILY MEDICINE

## 2019-12-16 PROCEDURE — G0438 PPPS, INITIAL VISIT: HCPCS | Performed by: FAMILY MEDICINE

## 2019-12-16 PROCEDURE — 4040F PNEUMOC VAC/ADMIN/RCVD: CPT | Performed by: FAMILY MEDICINE

## 2019-12-16 PROCEDURE — 1123F ACP DISCUSS/DSCN MKR DOCD: CPT | Performed by: FAMILY MEDICINE

## 2019-12-16 ASSESSMENT — LIFESTYLE VARIABLES
HOW MANY STANDARD DRINKS CONTAINING ALCOHOL DO YOU HAVE ON A TYPICAL DAY: 0
HAS A RELATIVE, FRIEND, DOCTOR, OR ANOTHER HEALTH PROFESSIONAL EXPRESSED CONCERN ABOUT YOUR DRINKING OR SUGGESTED YOU CUT DOWN: 0
HOW OFTEN DURING THE LAST YEAR HAVE YOU HAD A FEELING OF GUILT OR REMORSE AFTER DRINKING: 0
HOW OFTEN DURING THE LAST YEAR HAVE YOU BEEN UNABLE TO REMEMBER WHAT HAPPENED THE NIGHT BEFORE BECAUSE YOU HAD BEEN DRINKING: 0
HOW OFTEN DURING THE LAST YEAR HAVE YOU FOUND THAT YOU WERE NOT ABLE TO STOP DRINKING ONCE YOU HAD STARTED: 0
HOW OFTEN DURING THE LAST YEAR HAVE YOU FAILED TO DO WHAT WAS NORMALLY EXPECTED FROM YOU BECAUSE OF DRINKING: 0
HAVE YOU OR SOMEONE ELSE BEEN INJURED AS A RESULT OF YOUR DRINKING: 0
HOW OFTEN DO YOU HAVE SIX OR MORE DRINKS ON ONE OCCASION: 0
AUDIT-C TOTAL SCORE: 4
AUDIT TOTAL SCORE: 4
HOW OFTEN DURING THE LAST YEAR HAVE YOU NEEDED AN ALCOHOLIC DRINK FIRST THING IN THE MORNING TO GET YOURSELF GOING AFTER A NIGHT OF HEAVY DRINKING: 0
HOW OFTEN DO YOU HAVE A DRINK CONTAINING ALCOHOL: 4

## 2019-12-16 ASSESSMENT — PATIENT HEALTH QUESTIONNAIRE - PHQ9
SUM OF ALL RESPONSES TO PHQ QUESTIONS 1-9: 0
SUM OF ALL RESPONSES TO PHQ QUESTIONS 1-9: 0

## 2020-03-23 RX ORDER — BENAZEPRIL HYDROCHLORIDE 40 MG/1
40 TABLET, FILM COATED ORAL DAILY
Qty: 90 TABLET | Refills: 3 | Status: SHIPPED | OUTPATIENT
Start: 2020-03-23 | End: 2020-04-06 | Stop reason: SDUPTHER

## 2020-04-06 RX ORDER — PROBENECID 500 MG/1
500 TABLET, FILM COATED ORAL DAILY
Qty: 90 TABLET | Refills: 3 | Status: SHIPPED | OUTPATIENT
Start: 2020-04-06 | End: 2021-01-26

## 2020-04-06 RX ORDER — BENAZEPRIL HYDROCHLORIDE 40 MG/1
40 TABLET, FILM COATED ORAL DAILY
Qty: 90 TABLET | Refills: 3 | Status: SHIPPED | OUTPATIENT
Start: 2020-04-06 | End: 2021-01-26

## 2020-06-16 ENCOUNTER — VIRTUAL VISIT (OUTPATIENT)
Dept: FAMILY MEDICINE CLINIC | Age: 80
End: 2020-06-16
Payer: MEDICARE

## 2020-06-16 PROCEDURE — 99443 PR PHYS/QHP TELEPHONE EVALUATION 21-30 MIN: CPT | Performed by: FAMILY MEDICINE

## 2020-06-16 NOTE — PROGRESS NOTES
Patient: Mian Lynch    YOB: 1940    Date: 20    Chief Complaint   Patient presents with    Hypertension     6 month follow up. He is due for labs. He is doing well. Patient Active Problem List    Diagnosis Date Noted    Essential hypertension 05/10/2017    Chronic kidney disease, stage III (moderate) (HCC)     Prostate cancer ([facility] [address]      Overview Note:     Radiation therapy      Gout     Arthritis        Allergies   Allergen Reactions    Altace [Ramipril]     Ramipril Other (See Comments)    Uloric [Febuxostat] Other (See Comments)       There were no vitals filed for this visit. There is no height or weight on file to calculate BMI. HPI    TELEHEALTH EVALUATION -- Audio/Visual (During [facility] public health emergency        Due to COVID 19 outbreak, patient's office visit was converted to a virtual visit. The patient was identified at the start of the visit. Patient was contacted and agreed to proceed with a virtual visit via Telephone Visit Time spent in visit with management in direct patient care 24 minutes. The risks and benefits of converting to a virtual visit were discussed in light of the current infectious disease epidemic. Patient also understood that insurance coverage and co-pays are up to their individual insurance plans and this is a billable visit. Pursuant to the emergency declaration under the [facility] waiver authority and the [employer] and [employer] Act, this Virtual  Visit was conducted, with patient's consent, to reduce the patient's risk of exposure to COVID-19 and provide continuity of care for an established patient. Services were provided through a phone discussion virtually to substitute for in-person clinic visit. The patient was located home and myself, the provider is located office.     Patient michelle morrow (:  36 ) has requested an audio/video evaluation for the following concern(s):     HPI:    He is following up on his blood pressure and he did have surgery at the [facility] for skin cancer in his blood pressure was elevated at that time. He has not rechecked it. He feels well and does not really want his medication adjusted. He thought he had a follow-up with his urologist on his prostate cancer but we could not find it in the systems when I have him call them and make a follow-up appointment. He has been dealing with his daughter's cancer that is a stressor on the family but he has been helping out with her grandchildren as well as driving her places. Review of Systems    Constitutional: Negative for fatigue, fever and sweats. HEENT: Negative for eye discharge and vision loss. Negative for ear drainage, hearing loss and nasal drainage. Respiratory: Negative for cough, dyspnea and wheezing. Cardiovascular:  Negative for chest pain, claudication and irregular heartbeat/palpitations. Gastrointestinal: Negative for abdominal pain, nausea, vomiting, constipation and diarrhea. Genitourinary: No dysuria or penile discharge. Metabolic/Endocrine: Negative for cold intolerance, heat intolerance, polydipsia and polyphagia. No unintended weight loss or gain. Neuro/Psychiatric: Negative for gait disturbance. Negative for psychiatric symptoms. Dermatologic: Negative for pruritus and rash. Musculoskeletal: Negative for bone/joint symptoms. No numbness or tingling. No weakness. Hematology: Negative for bleeding and easy bruising. Immunology:  Negative for environmental allergies and food allergies. Physical Exam    Not able to be done as this was a phone visit. Patient was alert, oriented, appropriate, not SOB with talking and not in distress. Assessment:   Diagnosis Orders   1. Essential hypertension  Comprehensive Metabolic Panel   2. Screening for hypercholesterolemia  Lipid, Fasting   3.  Prostate cancer [facility])   called

## 2020-06-24 DIAGNOSIS — I10 ESSENTIAL HYPERTENSION: ICD-10-CM

## 2020-06-24 DIAGNOSIS — Z13.220 SCREENING FOR HYPERCHOLESTEROLEMIA: ICD-10-CM

## 2020-06-24 DIAGNOSIS — M10.9 ACUTE GOUT OF LEFT FOOT, UNSPECIFIED CAUSE: ICD-10-CM

## 2020-06-24 LAB
ALBUMIN SERPL-MCNC: 4.2 G/DL (ref 3.5–4.6)
ALP BLD-CCNC: 56 U/L (ref 35–104)
ALT SERPL-CCNC: 10 U/L (ref 0–41)
ANION GAP SERPL CALCULATED.3IONS-SCNC: 12 MEQ/L (ref 9–15)
AST SERPL-CCNC: 15 U/L (ref 0–40)
BILIRUB SERPL-MCNC: 0.4 MG/DL (ref 0.2–0.7)
BUN BLDV-MCNC: 26 MG/DL (ref 8–23)
CALCIUM SERPL-MCNC: 9.4 MG/DL (ref 8.5–9.9)
CHLORIDE BLD-SCNC: 104 MEQ/L (ref 95–107)
CHOLESTEROL, FASTING: 179 MG/DL (ref 0–199)
CO2: 25 MEQ/L (ref 20–31)
CREAT SERPL-MCNC: 1.58 MG/DL (ref 0.7–1.2)
GFR AFRICAN AMERICAN: 51
GFR NON-AFRICAN AMERICAN: 42.2
GLOBULIN: 2.8 G/DL (ref 2.3–3.5)
GLUCOSE BLD-MCNC: 111 MG/DL (ref 70–99)
HDLC SERPL-MCNC: 52 MG/DL (ref 40–59)
LDL CHOLESTEROL CALCULATED: 116 MG/DL (ref 0–129)
POTASSIUM SERPL-SCNC: 5 MEQ/L (ref 3.4–4.9)
SODIUM BLD-SCNC: 141 MEQ/L (ref 135–144)
TOTAL PROTEIN: 7 G/DL (ref 6.3–8)
TRIGLYCERIDE, FASTING: 54 MG/DL (ref 0–150)
URIC ACID, SERUM: 7.5 MG/DL (ref 3.4–7)

## 2020-09-09 DIAGNOSIS — C61 PROSTATE CANCER (HCC): ICD-10-CM

## 2020-09-09 LAB — PROSTATE SPECIFIC ANTIGEN: 1.61 NG/ML (ref 0–6.22)

## 2020-12-17 ENCOUNTER — OFFICE VISIT (OUTPATIENT)
Dept: FAMILY MEDICINE CLINIC | Age: 80
End: 2020-12-17
Payer: MEDICARE

## 2020-12-17 VITALS
DIASTOLIC BLOOD PRESSURE: 70 MMHG | RESPIRATION RATE: 16 BRPM | SYSTOLIC BLOOD PRESSURE: 134 MMHG | WEIGHT: 186 LBS | OXYGEN SATURATION: 98 % | HEART RATE: 81 BPM | BODY MASS INDEX: 26.63 KG/M2 | TEMPERATURE: 98.5 F | HEIGHT: 70 IN

## 2020-12-17 VITALS
SYSTOLIC BLOOD PRESSURE: 134 MMHG | WEIGHT: 186 LBS | OXYGEN SATURATION: 98 % | HEIGHT: 70 IN | BODY MASS INDEX: 26.63 KG/M2 | TEMPERATURE: 98.5 F | RESPIRATION RATE: 16 BRPM | HEART RATE: 81 BPM | DIASTOLIC BLOOD PRESSURE: 70 MMHG

## 2020-12-17 PROCEDURE — G0439 PPPS, SUBSEQ VISIT: HCPCS | Performed by: FAMILY MEDICINE

## 2020-12-17 PROCEDURE — 1123F ACP DISCUSS/DSCN MKR DOCD: CPT | Performed by: FAMILY MEDICINE

## 2020-12-17 PROCEDURE — G8427 DOCREV CUR MEDS BY ELIG CLIN: HCPCS | Performed by: FAMILY MEDICINE

## 2020-12-17 PROCEDURE — 4040F PNEUMOC VAC/ADMIN/RCVD: CPT | Performed by: FAMILY MEDICINE

## 2020-12-17 PROCEDURE — G8417 CALC BMI ABV UP PARAM F/U: HCPCS | Performed by: FAMILY MEDICINE

## 2020-12-17 PROCEDURE — 1036F TOBACCO NON-USER: CPT | Performed by: FAMILY MEDICINE

## 2020-12-17 PROCEDURE — G8484 FLU IMMUNIZE NO ADMIN: HCPCS | Performed by: FAMILY MEDICINE

## 2020-12-17 PROCEDURE — 99213 OFFICE O/P EST LOW 20 MIN: CPT | Performed by: FAMILY MEDICINE

## 2020-12-17 SDOH — ECONOMIC STABILITY: FOOD INSECURITY: WITHIN THE PAST 12 MONTHS, THE FOOD YOU BOUGHT JUST DIDN'T LAST AND YOU DIDN'T HAVE MONEY TO GET MORE.: NOT ASKED

## 2020-12-17 SDOH — ECONOMIC STABILITY: TRANSPORTATION INSECURITY
IN THE PAST 12 MONTHS, HAS LACK OF TRANSPORTATION KEPT YOU FROM MEETINGS, WORK, OR FROM GETTING THINGS NEEDED FOR DAILY LIVING?: NO

## 2020-12-17 SDOH — ECONOMIC STABILITY: FOOD INSECURITY: WITHIN THE PAST 12 MONTHS, YOU WORRIED THAT YOUR FOOD WOULD RUN OUT BEFORE YOU GOT MONEY TO BUY MORE.: NEVER TRUE

## 2020-12-17 SDOH — ECONOMIC STABILITY: TRANSPORTATION INSECURITY
IN THE PAST 12 MONTHS, HAS THE LACK OF TRANSPORTATION KEPT YOU FROM MEDICAL APPOINTMENTS OR FROM GETTING MEDICATIONS?: NO

## 2020-12-17 SDOH — ECONOMIC STABILITY: INCOME INSECURITY: HOW HARD IS IT FOR YOU TO PAY FOR THE VERY BASICS LIKE FOOD, HOUSING, MEDICAL CARE, AND HEATING?: NOT HARD AT ALL

## 2020-12-17 ASSESSMENT — PATIENT HEALTH QUESTIONNAIRE - PHQ9
SUM OF ALL RESPONSES TO PHQ QUESTIONS 1-9: 1
2. FEELING DOWN, DEPRESSED OR HOPELESS: 1
SUM OF ALL RESPONSES TO PHQ QUESTIONS 1-9: 1
1. LITTLE INTEREST OR PLEASURE IN DOING THINGS: 0
SUM OF ALL RESPONSES TO PHQ QUESTIONS 1-9: 1
SUM OF ALL RESPONSES TO PHQ9 QUESTIONS 1 & 2: 1

## 2020-12-17 NOTE — PATIENT INSTRUCTIONS
Personalized Preventive Plan for Carlos Peck - 12/17/2020  Medicare offers a range of preventive health benefits. Some of the tests and screenings are paid in full while other may be subject to a deductible, co-insurance, and/or copay. Some of these benefits include a comprehensive review of your medical history including lifestyle, illnesses that may run in your family, and various assessments and screenings as appropriate. After reviewing your medical record and screening and assessments performed today your provider may have ordered immunizations, labs, imaging, and/or referrals for you. A list of these orders (if applicable) as well as your Preventive Care list are included within your After Visit Summary for your review. Other Preventive Recommendations:    · A preventive eye exam performed by an eye specialist is recommended every 1-2 years to screen for glaucoma; cataracts, macular degeneration, and other eye disorders. · A preventive dental visit is recommended every 6 months. · Try to get at least 150 minutes of exercise per week or 10,000 steps per day on a pedometer . · Order or download the FREE \"Exercise & Physical Activity: Your Everyday Guide\" from [employer]. Call 4-175.199.9764 or search [employer] online. · You need 6382-2403 mg of calcium and 1537-5566 IU of vitamin D per day. It is possible to meet your calcium requirement with diet alone, but a vitamin D supplement is usually necessary to meet this goal.  · When exposed to the sun, use a sunscreen that protects against both UVA and UVB radiation with an SPF of 30 or greater. Reapply every 2 to 3 hours or after sweating, drying off with a towel, or swimming. · Always wear a seat belt when traveling in a car. Always wear a helmet when riding a bicycle or motorcycle. Heart-Healthy Diet   Sodium, Fat, and Cholesterol Controlled Diet       What Is a Heart Healthy Diet? High-density lipoprotein (HDL) cholesterol  Also known as good cholesterol, this type of cholesterol actually carries cholesterol away from your arteries and may, therefore, help lower your risk of having a heart attack. You want this level to be high (ideally greater than 60). It is a risk to have a level less than 40. You can raise this good cholesterol by eating olive oil, canola oil, avocados, or nuts. Exercise raises this level, too. Fat    Fat is calorie dense and packs a lot of calories into a small amount of food. Even though fats should be limited due to their high calorie content, not all fats are bad. In fact, some fats are quite healthful. Fat can be broken down into four main types.    The good-for-you fats are:   Monounsaturated fat  found in oils such as olive and canola, avocados, and nuts and natural nut butters; can decrease cholesterol levels, while keeping levels of HDL cholesterol high   Polyunsaturated fat  found in oils such as safflower, sunflower, soybean, corn, and sesame; can decrease total cholesterol and LDL cholesterol   Omega-3 fatty acids  particularly those found in fatty fish (such as salmon, trout, tuna, mackerel, herring, and sardines); can decrease risk of arrhythmias, decrease triglyceride levels, and slightly lower blood pressure   The fats that you want to limit are:   Saturated fat  found in animal products, many fast foods, and a few vegetables; increases total blood cholesterol, including LDL levels   Animal fats that are saturated include: butter, lard, whole-milk dairy products, meat fat, and poultry skin   Vegetable fats that are saturated include: hydrogenated shortening, palm oil, coconut oil, cocoa butter   Hydrogenated or trans fat  found in margarine and vegetable shortening, most shelf stable snack foods, and fried foods; increases LDL and decreases HDL It is generally recommended that you limit your total fat for the day to less than 30% of your total calories. If you follow an 1800-calorie heart healthy diet, for example, this would mean 60 grams of fat or less per day. Saturated fat and trans fat in your diet raises your blood cholesterol the most, much more than dietary cholesterol does. For this reason, on a heart-healthy diet, less than 7% of your calories should come from saturated fat and ideally 0% from trans fat. On an 1800-calorie diet, this translates into less than 14 grams of saturated fat per day, leaving 46 grams of fat to come from mono- and polyunsaturated fats.    Food Choices on a Heart Healthy Diet   Food Category   Foods Recommended   Foods to Avoid   Grains   Breads and rolls without salted tops Most dry and cooked cereals Unsalted crackers and breadsticks Low-sodium or homemade breadcrumbs or stuffing All rice and pastas   Breads, rolls, and crackers with salted tops High-fat baked goods (eg, muffins, donuts, pastries) Quick breads, self-rising flour, and biscuit mixes Regular bread crumbs Instant hot cereals Commercially prepared rice, pasta, or stuffing mixes   Vegetables   Most fresh, frozen, and low-sodium canned vegetables Low-sodium and salt-free vegetable juices Canned vegetables if unsalted or rinsed   Regular canned vegetables and juices, including sauerkraut and pickled vegetables Frozen vegetables with sauces Commercially prepared potato and vegetable mixes   Fruits   Most fresh, frozen, and canned fruits All fruit juices   Fruits processed with salt or sodium   Milk   Nonfat or low-fat (1%) milk Nonfat or low-fat yogurt Cottage cheese, low-fat ricotta, cheeses labeled as low-fat and low-sodium   Whole milk Reduced-fat (2%) milk Malted and chocolate milk Full fat yogurt Most cheeses (unless low-fat and low salt) Buttermilk (no more than 1 cup per week)   Meats and Beans Lean cuts of fresh or frozen beef, veal, lamb, or pork (look for the word loin) Fresh or frozen poultry without the skin Fresh or frozen fish and some shellfish Egg whites and egg substitutes (Limit whole eggs to three per week) Tofu Nuts or seeds (unsalted, dry-roasted), low-sodium peanut butter Dried peas, beans, and lentils   Any smoked, cured, salted, or canned meat, fish, or poultry (including schmitt, chipped beef, cold cuts, hot dogs, sausages, sardines, and anchovies) Poultry skins Breaded and/or fried fish or meats Canned peas, beans, and lentils Salted nuts   Fats and Oils   Olive oil and canola oil Low-sodium, low-fat salad dressings and mayonnaise   Butter, margarine, coconut and palm oils, schmitt fat   Snacks, Sweets, and Condiments   Low-sodium or unsalted versions of broths, soups, soy sauce, and condiments Pepper, herbs, and spices; vinegar, lemon, or lime juice Low-fat frozen desserts (yogurt, sherbet, fruit bars) Sugar, cocoa powder, honey, syrup, jam, and preserves Low-fat, trans-fat free cookies, cakes, and pies Doug and animal crackers, fig bars, katy snaps   High-fat desserts Broth, soups, gravies, and sauces, made from instant mixes or other high-sodium ingredients Salted snack foods Canned olives Meat tenderizers, seasoning salt, and most flavored vinegars   Beverages   Low-sodium carbonated beverages Tea and coffee in moderation Soy milk   Commercially softened water   Suggestions   Make whole grains, fruits, and vegetables the base of your diet. Choose heart-healthy fats such as canola, olive, and flaxseed oil, and foods high in heart-healthy fats, such as nuts, seeds, soybeans, tofu, and fish. Eat fish at least twice per week; the fish highest in omega-3 fatty acids and lowest in mercury include salmon, herring, mackerel, sardines, and canned chunk light tuna. If you eat fish less than twice per week or have high triglycerides, talk to your doctor about taking fish oil supplements. Read food labels. For products low in fat and cholesterol, look for fat free, low-fat, cholesterol free, saturated fat free, and trans fat freeAlso scan the Nutrition Facts Label, which lists saturated fat, trans fat, and cholesterol amounts. For products low in sodium, look for sodium free, very low sodium, low sodium, no added salt, and unsalted   Skip the salt when cooking or at the table; if food needs more flavor, get creative and try out different herbs and spices. Garlic and onion also add substantial flavor to foods. Trim any visible fat off meat and poultry before cooking, and drain the fat off after mercer. Use cooking methods that require little or no added fat, such as grilling, boiling, baking, poaching, broiling, roasting, steaming, stir-frying, and sauting. Avoid fast food and convenience food. They tend to be high in saturated and trans fat and have a lot of added salt. Talk to a registered dietitian for individualized diet advice. Last Reviewed: March 2011 Matthew Brunson MS, MPH, RD   Updated: 3/29/2011   ·     Keep Your Memory Danette Cain       Many factors can affect your ability to remembera hectic lifestyle, aging, stress, chronic disease, and certain medicines. But, there are steps you can take to sharpen your mind and help preserve your memory. Challenge Your Brain   Regularly challenging your mind may help keeps it in top shape. Good mental exercises include:   Crossword puzzlesUse a dictionary if you need it; you will learn more that way. Brainteasers Try some!    Crafts, such as wood working and [facility], such as gardening and building model airplanes SocializingVisit old friends or join groups to meet new ones. Reading   Learning a new language   Taking a class, whether it be art history or zoe chi   TravelingExperience the food, history, and culture of your destination   Learning to use a computer   Going to museums, the theater, or thought-provoking movies   Changing things in your daily life, such as reversing your pattern in the grocery store or brushing your teeth using your nondominant hand   Use Memory Aids   There is no need to remember every detail on your own. These memory aids can help:   Calendars and day planners   Electronic organizers to store all sorts of helpful informationThese devices can \"beep\" to remind you of appointments. A book of days to record birthdays, anniversaries, and other occasions that occur on the same date every year   Detailed \"to-do\" lists and strategically placed sticky notes   Quick \"study\" sessionsBefore a gathering, review who will be there so their names will be fresh in your mind. Establish routinesFor example, keep your keys, wallet, and umbrella in the same place all the time or take medicine with your 8:00 AM glass of juice   Live a Healthy Life   Many actions that will keep your body strong will do the same for your mind. For example:   Talk to Your Doctor About Herbs and Supplements    Malnutrition and vitamin deficiencies can impair your mental function. For example, vitamin B12 deficiency can cause a range of symptoms, including confusion. But, what if your nutritional needs are being met? Can herbs and supplements still offer a benefit? Researchers have investigated a range of natural remedies, such as ginkgo , ginseng , and the supplement phosphatidylserine (PS). So far, though, the evidence is inconsistent as to whether these products can improve memory or thinking. If you are interested in taking herbs and supplements, talk to your doctor first because they may interact with other medicines that you are taking. Exercise Regularly    Among the many benefits of regular exercise are increased blood flow to the brain and decreased risk of certain diseases that can interfere with memory function. One study found that even moderate exercise has a beneficial effect. Examples of \"moderate\" exercise include:   Playing 18 holes of golf once a week, without a cart   Playing tennis twice a week   Walking one mile per day   Manage Stress    It can be tough to remember what is important when your mind is cluttered. Make time for relaxation. Choose activities that calm you down, and make it routine. Manage Chronic Conditions    Side effects of high blood pressure , diabetes, and heart disease can interfere with mental function. Many of the lifestyle steps discussed here can help manage these conditions. Strive to eat a healthy diet, exercise regularly, get stress under control, and follow your doctor's advice for your condition. Minimize Medications    Talk to your doctor about the medicines that you take. Some may be unnecessary. Also, healthy lifestyle habits may lower the need for certain drugs. Last Reviewed: April 2010 Regis Oliver MD   Updated: 4/13/2010   ·     [address]       As we get older, changes in balance, gait, strength, vision, hearing, and cognition make even the most youthful senior more prone to accidents. Falls are one of the leading health risks for older people.  This increased risk of falling is related to:   Aging process (eg, decreased muscle strength, slowed reflexes)   Higher incidence of chronic health problems (eg, arthritis, diabetes) that may limit mobility, agility or sensory awareness Side effects of medicine (eg, dizziness, blurred vision)especially medicines like prescription pain medicines and drugs used to treat mental health conditions   Depending on the brittleness of your bones, the consequences of a fall can be serious and long lasting. Home Life   Research by the Association of Aging [facility]) shows that some home accidents among older adults can be prevented by making simple lifestyle changes and basic modifications and repairs to the home environment. Here are some lifestyle changes that experts recommend:   Have your hearing and vision checked regularly. Be sure to wear prescription glasses that are right for you. Speak to your doctor or pharmacist about the possible side effects of your medicines. A number of medicines can cause dizziness. If you have problems with sleep, talk to your doctor. Limit your intake of alcohol. If necessary, use a cane or walker to help maintain your balance. Wear supportive, rubber-soled shoes, even at home. If you live in a region that gets wintry weather, you may want to put special cleats on your shoes to prevent you from slipping on the snow and ice. Exercise regularly to help maintain muscle tone, agility, and balance. Always hold the banister when going up or down stairs. Also, use  bars when getting in or out of the bath or shower, or using the toilet. To avoid dizziness, get up slowly from a lying down position. Sit up first, dangling your legs for a minute or two before rising to a standing position. Overall Home Safety Check   According to the Consumer Product Safety Commision's \"Older Consumer Home Safety Checklist,\" it is important to check for potential hazards in each room. And remember, proper lighting is an essential factor in home safety. If you cannot see clearly, you are more likely to fall.    Important questions to ask yourself include: Are lamp, electric, extension, and telephone cords placed out of the flow of traffic and maintained in good condition? Have frayed cords been replaced? Are all small rugs and runners slip resistant? If not, you can secure them to the floor with a special double-sided carpet tape. Are smoke detectors properly locatedone on every floor of your home and one outside of every sleeping area? Are they in good working order? Are batteries replaced at least once a year? Do you have a well-maintained carbon monoxide detector outside every sleeping are in your home? Does your furniture layout leave plenty of space to maneuver between and around chairs, tables, beds, and sofas? Are hallways, stairs and passages between rooms well lit? Can you reach a lamp without getting out of bed? Are floor surfaces well maintained? Shag rugs, high-pile carpeting, tile floors, and polished wood floors can be particularly slippery. Stairs should always have handrails and be carpeted or fitted with a non-skid tread. Is your telephone easily reachable. Is the cord safely tucked away? Room by Room   According to the Association of Aging, bathrooms and nnamdi are the two most potentially hazardous rooms in your home. In the Kitchen    Be sure your stove is in proper working order and always make sure burners and the oven are off before you go out or go to sleep. Keep pots on the back burners, turn handles away from the front of the stove, and keep stove clean and free of grease build-up. Kitchen ventilation systems and range exhausts should be working properly. Keep flammable objects such as towels and pot holders away from the cooking area except when in use. Make sure kitchen curtains are tied back. Move cords and appliances away from the sink and hot surfaces. If extension cords are needed, install wiring guides so they do not hang over the sink, range, or working areas. Look for coffee pots, kettles and toaster ovens with automatic shut-offs. Keep a mop handy in the kitchen so you can wipe up spills instantly. You should also have a small fire extinguisher. Arrange your kitchen with frequently used items on lower shelves to avoid the need to stand on a stepstool to reach them. Make sure countertops are well-lit to avoid injuries while cutting and preparing food. In the Bathroom    Use a non-slip mat or decals in the tub and shower, since wet, soapy tile or porcelain surfaces are extremely slippery. Make sure bathroom rugs are non-skid or tape them firmly to the floor. Bathtubs should have at least one, preferably two, grab bars, firmly attached to structural supports in the wall. (Do not use built-in soap holders or glass shower doors as grab bars.)    Tub seats fitted with non-slip material on the legs allow you to wash sitting down. For people with limited mobility, bathtub transfer benches allow you to slide safely into the tub. Raised toilet seats and toilet safety rails are helpful for those with knee or hip problems. In the [facility]    Make sure you use a nightlight and that the area around your bed is clear of potential obstacles. Be careful with electric blankets and never go to sleep with a heating pad, which can cause serious burns even if on a low setting. Use fire-resistant mattress covers and pillows, and NEVER smoke in bed. Keep a phone next to the bed that is programmed to dial 911 at the push of a button. If you have a chronic condition, you may want to sign on with an automatic call-in service. Typically the system includes a small pendant that connects directly to an emergency medical voice-response system. You should also make arrangements to stay in contact with someonefriend, neighbor, family memberon a regular schedule.    Fire Prevention According to the National S.A.F.E. (Smoke Alarms for Every) [facility] [facility], senior citizens are one of the two highest risk groups for death and serious injuries due to residential fires. When cooking, wear short-sleeved items, never a bulky long-sleeved robe. The [facility]'s Safety Checklist for Older Consumers emphasizes the importance of checking basements, garages, workshops and storage areas for fire hazards, such as volatile liquids, piles of old rags or clothing and overloaded circuits. Never smoke in bed or when lying down on a couch or recliner chair. Small portable electric or kerosene heaters are responsible for many home fires and should be used cautiously if at all. If you do use one, be sure to keep them away from flammable materials. In case of fire, make sure you have a pre-established emergency exit plan. Have a professional check your fireplace and other fuel-burning appliances yearly. [employer]   Baby boomers entering the yoo years will continue to see the development of new products to help older adults live safely and independently in spite of age-related changes. Making Life More Livable  , by [employer], lists over 1,000 products for \"living well in the mature years,\" such as bathing and mobility aids, household security devices, ergonomically designed knives and peelers, and faucet valves and knobs for temperature control. Medical supply stores and organizations are good sources of information about products that improve your quality of life and insure your safety.      Last Reviewed: November 2009 Dejah Teixeira MD   Updated: 3/7/2011     ·        Advance Care Planning: Care Instructions  Your Care Instructions It can be hard to live with an illness that cannot be cured. But if your health is getting worse, you may want to make decisions about end-of-life care. Planning for the end of your life does not mean that you are giving up. It is a way to make sure that your wishes are met. Clearly stating your wishes can make it easier for your loved ones. Making plans while you are still able may also ease your mind and make your final days less stressful and more meaningful. Follow-up care is a key part of your treatment and safety. Be sure to make and go to all appointments, and call your doctor if you are having problems. It's also a good idea to know your test results and keep a list of the medicines you take. What can you do to plan for the end of life? You can bring these issues up with your doctor. You do not need to wait until your doctor starts the conversation. You might start with \"I would not be willing to live with . Nery Money Nery Money Nery Money \" When you complete this sentence it helps your doctor understand your wishes. Talk openly and honestly with your doctor. This is the best way to understand the decisions you will need to make as your health changes. Know that you can always change your mind. Ask your doctor about commonly used life-support measures. These include tube feedings, breathing machines, and fluids given through a vein (IV). Understanding these treatments will help you decide whether you want them. You may choose to have these life-supporting treatments for a limited time. This allows a trial period to see whether they will help you. You may also decide that you want your doctor to take only certain measures to keep you alive. It is important to spell out these conditions so that your doctor and family understand them. Talk to your doctor about how long you are likely to live. He or she may be able to give you an idea of what usually happens with your specific illness. These legal papers are simple to change. Tell your doctor what you want to change, and ask him or her to make a note in your medical file. Give your family updated copies of the papers. Where can you learn more? Go to [email or web address]://[email or web address] org and sign in to your [employer] account. Enter P184 in the [employer] box to learn more about \"Advance Care Planning: Care Instructions. \"     If you do not have an account, please click on the \"Sign Up Now\" link. Current as of: December 9, 2019               Content Version: 12.6  © 0552-8163 [employer]. Care instructions adapted under license by [facility] ([facility]). If you have questions about a medical condition or this instruction, always ask your healthcare professional. [address] any warranty or liability for your use of this information. ·   Personalized Preventive Plan for Perla Shannon - 12/17/2020  Medicare offers a range of preventive health benefits. Some of the tests and screenings are paid in full while other may be subject to a deductible, co-insurance, and/or copay. Some of these benefits include a comprehensive review of your medical history including lifestyle, illnesses that may run in your family, and various assessments and screenings as appropriate. After reviewing your medical record and screening and assessments performed today your provider may have ordered immunizations, labs, imaging, and/or referrals for you. A list of these orders (if applicable) as well as your Preventive Care list are included within your After Visit Summary for your review. Other Preventive Recommendations:    A preventive eye exam performed by an eye specialist is recommended every 1-2 years to screen for glaucoma; cataracts, macular degeneration, and other eye disorders. A preventive dental visit is recommended every 6 months. Try to get at least 150 minutes of exercise per week or 10,000 steps per day on a pedometer . Order or download the FREE \"Exercise & Physical Activity: Your Everyday Guide\" from [employer]. Call 3-136.114.3342 or search [employer] online. You need 4403-7711 mg of calcium and 3209-2120 IU of vitamin D per day. It is possible to meet your calcium requirement with diet alone, but a vitamin D supplement is usually necessary to meet this goal.  When exposed to the sun, use a sunscreen that protects against both UVA and UVB radiation with an SPF of 30 or greater. Reapply every 2 to 3 hours or after sweating, drying off with a towel, or swimming. Always wear a seat belt when traveling in a car. Always wear a helmet when riding a bicycle or motorcycle.
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
## 2020-12-17 NOTE — PROGRESS NOTES
Patient: Kirti Espinoza    YOB: 1940    Date: 12/17/20    Chief Complaint   Patient presents with    Hypertension     6 month follow up. He is due for lab work. He is doing well. Patient Active Problem List    Diagnosis Date Noted    Essential hypertension 05/10/2017    Chronic kidney disease, stage III (moderate)     Prostate cancer ([facility] [address]      Overview Note:     Radiation therapy      Gout     Arthritis        Allergies   Allergen Reactions    Altace [Ramipril]     Ramipril Other (See Comments)    Uloric [Febuxostat] Other (See Comments)       Vitals:    12/17/20 0915   BP: 134/70   Site: Left Upper Arm   Position: Sitting   Cuff Size: Large Adult   Pulse: 81   Resp: 16   Temp: 98.5 °F (36.9 °C)   TempSrc: Oral   SpO2: 98%   Weight: 186 lb (84.4 kg)   Height: 5' 10\" (1.778 m)     Body mass index is 26.69 kg/m². PHQ Scores 12/17/2020 12/16/2019 6/26/2019 6/25/2018 1/5/2017 6/22/2015 6/24/2014   PHQ2 Score 1 0 0 0 0 0 0   PHQ9 Score 1 0 0 0 0 0 0     Interpretation of Total Score Depression Severity: 1-4 = Minimal depression, 5-9 = Mild depression, 10-14 = Moderate depression, 15-19 = Moderately severe depression, 20-27 = Severe depression    HPI    He comes in today to follow-up on his blood pressure. He is feeling well but he is worried because his brother is sick in the hospital with the COVID-19 virus. He cannot really visit which is a stressor as well. He physically is feeling well but he is being treated for skin cancer with Mohs procedure on his left ear. He has no other focal complaints and has been following up regularly on his prostate cancer. Review of Systems    Constitutional: Negative for fatigue, fever and sweats. HEENT: Negative for eye discharge and vision loss. Negative for ear drainage, hearing loss and nasal drainage. Respiratory: Negative for cough, dyspnea and wheezing. Cardiovascular:  Negative for chest pain, claudication and irregular heartbeat/palpitations. Gastrointestinal: Negative for abdominal pain, nausea, vomiting, constipation and diarrhea. Genitourinary: No dysuria or penile discharge. Metabolic/Endocrine: Negative for cold intolerance, heat intolerance, polydipsia and polyphagia. No unintended weight loss or gain. Neuro/Psychiatric: Negative for gait disturbance. Negative for psychiatric symptoms. Dermatologic: Negative for pruritus and rash. Musculoskeletal: positive for bone/joint symptoms. No numbness or tingling. No weakness. Hematology: Negative for bleeding and easy bruising. Immunology:  Negative for environmental allergies and food allergies. Physical Exam    Patient's medication, allergies, past medical, surgical, social and family histories were reviewed and updated as appropriate. PHYSICAL EXAM   General appearance: Alert oriented pleasant cooperative no acute distress. Neck: Soft nontender no adenopathy no bruits  Lungs: Clear to auscultation without wheezes rhonchi or rales  Heart: Regular rate and rhythm without murmurs rubs or gallops          Assessment:   Diagnosis Orders   1. Essential hypertension  Comprehensive Metabolic Panel    Lipid, Fasting  His blood pressure is controlled we will see him back in 6 months he is to call if he has any symptoms of edema               Plan:  Current Outpatient Medications   Medication Sig Dispense Refill    mupirocin (BACTROBAN) 2 % ointment Apply to wound 2 times daily      VITAMIN D PO Take 1,000 Units by mouth daily      probenecid (BENEMID) 500 MG tablet Take 1 tablet by mouth daily 90 tablet 3    benazepril (LOTENSIN) 40 MG tablet Take 1 tablet by mouth daily 90 tablet 3     No current facility-administered medications for this visit.       Orders Placed This Encounter   Procedures    Comprehensive Metabolic Panel     Standing Status:   Future     Standing Expiration Date:   12/17/2021  Lipid, Fasting     Standing Status:   Future     Standing Expiration Date:   12/17/2021       No orders of the defined types were placed in this encounter. Return in about 6 months (around 6/17/2021).     Dr. Natan Hernandez      12/17/20  9:49 AM

## 2020-12-18 DIAGNOSIS — I10 ESSENTIAL HYPERTENSION: ICD-10-CM

## 2020-12-18 LAB
ALBUMIN SERPL-MCNC: 4.3 G/DL (ref 3.5–4.6)
ALP BLD-CCNC: 55 U/L (ref 35–104)
ALT SERPL-CCNC: 14 U/L (ref 0–41)
ANION GAP SERPL CALCULATED.3IONS-SCNC: 14 MEQ/L (ref 9–15)
AST SERPL-CCNC: 22 U/L (ref 0–40)
BILIRUB SERPL-MCNC: 0.4 MG/DL (ref 0.2–0.7)
BUN BLDV-MCNC: 24 MG/DL (ref 8–23)
CALCIUM SERPL-MCNC: 9.9 MG/DL (ref 8.5–9.9)
CHLORIDE BLD-SCNC: 100 MEQ/L (ref 95–107)
CHOLESTEROL, FASTING: 187 MG/DL (ref 0–199)
CO2: 26 MEQ/L (ref 20–31)
CREAT SERPL-MCNC: 1.54 MG/DL (ref 0.7–1.2)
GFR AFRICAN AMERICAN: 52.5
GFR NON-AFRICAN AMERICAN: 43.4
GLOBULIN: 2.7 G/DL (ref 2.3–3.5)
GLUCOSE BLD-MCNC: 102 MG/DL (ref 70–99)
HDLC SERPL-MCNC: 53 MG/DL (ref 40–59)
LDL CHOLESTEROL CALCULATED: 113 MG/DL (ref 0–129)
POTASSIUM SERPL-SCNC: 4.7 MEQ/L (ref 3.4–4.9)
SODIUM BLD-SCNC: 140 MEQ/L (ref 135–144)
TOTAL PROTEIN: 7 G/DL (ref 6.3–8)
TRIGLYCERIDE, FASTING: 107 MG/DL (ref 0–150)

## 2020-12-21 ASSESSMENT — PATIENT HEALTH QUESTIONNAIRE - PHQ9
SUM OF ALL RESPONSES TO PHQ9 QUESTIONS 1 & 2: 0
SUM OF ALL RESPONSES TO PHQ QUESTIONS 1-9: 0
2. FEELING DOWN, DEPRESSED OR HOPELESS: 0
1. LITTLE INTEREST OR PLEASURE IN DOING THINGS: 0

## 2021-01-26 DIAGNOSIS — I10 ESSENTIAL HYPERTENSION: Primary | ICD-10-CM

## 2021-01-26 DIAGNOSIS — E79.0 HYPERURICEMIA: ICD-10-CM

## 2021-01-26 RX ORDER — BENAZEPRIL HYDROCHLORIDE 40 MG/1
40 TABLET, FILM COATED ORAL DAILY
Qty: 90 TABLET | Refills: 3 | Status: SHIPPED | OUTPATIENT
Start: 2021-01-26 | End: 2021-12-30 | Stop reason: SDUPTHER

## 2021-01-26 RX ORDER — PROBENECID 500 MG/1
500 TABLET, FILM COATED ORAL DAILY
Qty: 90 TABLET | Refills: 3 | Status: SHIPPED | OUTPATIENT
Start: 2021-01-26 | End: 2021-12-30 | Stop reason: SDUPTHER

## 2021-06-17 ENCOUNTER — OFFICE VISIT (OUTPATIENT)
Dept: FAMILY MEDICINE CLINIC | Age: 81
End: 2021-06-17
Payer: MEDICARE

## 2021-06-17 VITALS
HEART RATE: 68 BPM | DIASTOLIC BLOOD PRESSURE: 70 MMHG | BODY MASS INDEX: 25.48 KG/M2 | WEIGHT: 178 LBS | SYSTOLIC BLOOD PRESSURE: 142 MMHG | RESPIRATION RATE: 16 BRPM | OXYGEN SATURATION: 97 % | HEIGHT: 70 IN | TEMPERATURE: 98.2 F

## 2021-06-17 DIAGNOSIS — I10 ESSENTIAL HYPERTENSION: Primary | ICD-10-CM

## 2021-06-17 DIAGNOSIS — C61 PROSTATE CANCER (HCC): ICD-10-CM

## 2021-06-17 PROCEDURE — 4040F PNEUMOC VAC/ADMIN/RCVD: CPT | Performed by: FAMILY MEDICINE

## 2021-06-17 PROCEDURE — G8417 CALC BMI ABV UP PARAM F/U: HCPCS | Performed by: FAMILY MEDICINE

## 2021-06-17 PROCEDURE — G8427 DOCREV CUR MEDS BY ELIG CLIN: HCPCS | Performed by: FAMILY MEDICINE

## 2021-06-17 PROCEDURE — 1036F TOBACCO NON-USER: CPT | Performed by: FAMILY MEDICINE

## 2021-06-17 PROCEDURE — 99213 OFFICE O/P EST LOW 20 MIN: CPT | Performed by: FAMILY MEDICINE

## 2021-06-17 PROCEDURE — 1123F ACP DISCUSS/DSCN MKR DOCD: CPT | Performed by: FAMILY MEDICINE

## 2021-06-17 RX ORDER — HYDROCHLOROTHIAZIDE 25 MG/1
25 TABLET ORAL EVERY MORNING
Qty: 90 TABLET | Refills: 1 | Status: SHIPPED | OUTPATIENT
Start: 2021-06-17 | End: 2021-07-14 | Stop reason: SDUPTHER

## 2021-06-17 RX ORDER — MV-MIN/FA/VIT K/LUTEIN/ZEAXANT 200MCG-5MG
1 CAPSULE ORAL 2 TIMES DAILY WITH MEALS
COMMUNITY
Start: 2021-04-29 | End: 2021-08-06 | Stop reason: CLARIF

## 2021-06-17 ASSESSMENT — PATIENT HEALTH QUESTIONNAIRE - PHQ9
2. FEELING DOWN, DEPRESSED OR HOPELESS: 0
1. LITTLE INTEREST OR PLEASURE IN DOING THINGS: 0
SUM OF ALL RESPONSES TO PHQ9 QUESTIONS 1 & 2: 0
SUM OF ALL RESPONSES TO PHQ QUESTIONS 1-9: 0

## 2021-06-17 NOTE — PROGRESS NOTES
Patient: Felipe Garcia (: 1940) is a 78 y.o. male,Established patient, here for evaluation of the following chief complaint(s):  Chief Complaint   Patient presents with    Hypertension     6 month follow up. He is due for labs. He states he has alot of stress at home. Date: 21    Allergies   Allergen Reactions    Altace [Ramipril]     Ramipril Other (See Comments)    Uloric [Febuxostat] Other (See Comments)       Vitals:    21 1301 21 1306   BP: (!) 150/70 (!) 142/70   Site: Right Upper Arm Right Upper Arm   Position: Sitting Sitting   Cuff Size: Small Adult Small Adult   Pulse: 68    Resp: 16    Temp: 98.2 °F (36.8 °C)    TempSrc: Oral    SpO2: 97%    Weight: 178 lb (80.7 kg)    Height: 5' 10\" (1.778 m)      Body mass index is 25.54 kg/m². PHQ Scores 2020   PHQ2 Score 0 0 1 0 0 0 0   PHQ9 Score 0 0 1 0 0 0 0     Interpretation of Total Score Depression Severity: 1-4 = Minimal depression, 5-9 = Mild depression, 10-14 = Moderate depression, 15-19 = Moderately severe depression, 20-27 = Severe depression    HPI    He is following up on his blood pressure and his arthritis. He is doing well but has had a lot of stress at home with his wife who just had surgery then he is got a daughter living with him but has a son that has problems is just been a bit of a stress. He himself is feeling well without cough fever chills chest pressure nausea or vomiting and he is got his vaccines. Review of Systems    Constitutional: Negative for fatigue, fever and sweats. HEENT: Negative for eye discharge and vision loss. Negative for ear drainage, hearing loss and nasal drainage. Respiratory: Negative for cough, dyspnea and wheezing. Cardiovascular:  Negative for chest pain, claudication and irregular heartbeat/palpitations. Gastrointestinal: Negative for abdominal pain, nausea, vomiting, constipation and diarrhea. Genitourinary: No dysuria or penile discharge. Metabolic/Endocrine: Negative for cold intolerance, heat intolerance, polydipsia and polyphagia. No unintended weight loss or gain. Neuro/Psychiatric: Negative for gait disturbance. Negative for psychiatric symptoms. Dermatologic: Negative for pruritus and rash. Musculoskeletal: positive for bone/joint symptoms. No numbness or tingling. No weakness. Hematology: Negative for bleeding and easy bruising. Immunology:  Negative for environmental allergies and food allergies. Physical Exam    Patient's medication, allergies, past medical, surgical, social and family histories were reviewed and updated as appropriate. PHYSICAL EXAM     General: Alert oriented pleasant cooperative in no acute distress  Neck: Soft nontender no adenopathy no carotid bruits  Lungs: Clear to auscultation without wheezes rhonchi or rales  Heart: Regular rate and rhythm without murmurs rubs or gallops  Extremities: No rashes or edema no calf tenderness normal gait ambulates without assistance              Assessment:   Diagnosis Orders   1. Essential hypertension  hydroCHLOROthiazide (HYDRODIURIL) 25 MG tablet adding this medication at follow-up we will check his cholesterol and CMP   2. Prostate cancer [facility])   he follows with urology last PSA done September of last year per Dr. Maria De Jesus Casiano         On this date 06/17/21 I have spent 24 minutes reviewing previous notes, test results and face to face with the patient discussing the diagnosis and importance of compliance with the treatment plan.        Plan:  Current Outpatient Medications   Medication Sig Dispense Refill    Multiple Vitamins-Minerals (PRESERVISION AREDS 2+MULTI VIT) CAPS Take 1 capsule by mouth 2 times daily (with meals)      hydroCHLOROthiazide (HYDRODIURIL) 25 MG tablet Take 1 tablet by mouth every morning 90 tablet 1    benazepril (LOTENSIN) 40 MG tablet TAKE 1 TABLET BY MOUTH  DAILY 90 tablet 3    probenecid (BENEMID)

## 2021-07-14 ENCOUNTER — OFFICE VISIT (OUTPATIENT)
Dept: FAMILY MEDICINE CLINIC | Age: 81
End: 2021-07-14
Payer: MEDICARE

## 2021-07-14 VITALS
TEMPERATURE: 98.1 F | BODY MASS INDEX: 25.48 KG/M2 | DIASTOLIC BLOOD PRESSURE: 60 MMHG | RESPIRATION RATE: 16 BRPM | WEIGHT: 178 LBS | SYSTOLIC BLOOD PRESSURE: 122 MMHG | HEIGHT: 70 IN | OXYGEN SATURATION: 98 % | HEART RATE: 76 BPM

## 2021-07-14 DIAGNOSIS — R14.2 BELCHING: ICD-10-CM

## 2021-07-14 DIAGNOSIS — I10 ESSENTIAL HYPERTENSION: Primary | ICD-10-CM

## 2021-07-14 DIAGNOSIS — Z13.220 SCREENING FOR HYPERCHOLESTEROLEMIA: ICD-10-CM

## 2021-07-14 PROCEDURE — 1036F TOBACCO NON-USER: CPT | Performed by: FAMILY MEDICINE

## 2021-07-14 PROCEDURE — 4040F PNEUMOC VAC/ADMIN/RCVD: CPT | Performed by: FAMILY MEDICINE

## 2021-07-14 PROCEDURE — G8427 DOCREV CUR MEDS BY ELIG CLIN: HCPCS | Performed by: FAMILY MEDICINE

## 2021-07-14 PROCEDURE — 1123F ACP DISCUSS/DSCN MKR DOCD: CPT | Performed by: FAMILY MEDICINE

## 2021-07-14 PROCEDURE — G8417 CALC BMI ABV UP PARAM F/U: HCPCS | Performed by: FAMILY MEDICINE

## 2021-07-14 PROCEDURE — 99213 OFFICE O/P EST LOW 20 MIN: CPT | Performed by: FAMILY MEDICINE

## 2021-07-14 RX ORDER — HYDROCHLOROTHIAZIDE 25 MG/1
25 TABLET ORAL EVERY MORNING
Qty: 90 TABLET | Refills: 3 | Status: SHIPPED | OUTPATIENT
Start: 2021-07-14 | End: 2021-07-15

## 2021-07-14 NOTE — PROGRESS NOTES
Patient: Tam Case (: 1940) is a 78 y.o. male,Established patient, here for evaluation of the following chief complaint(s):  Chief Complaint   Patient presents with    Hypertension     follow up.  Other     He complains of belching often. Date: 21    Allergies   Allergen Reactions    Altace [Ramipril]     Ramipril Other (See Comments)    Uloric [Febuxostat] Other (See Comments)       Vitals:    21 1257   BP: 122/60   Site: Left Upper Arm   Position: Sitting   Cuff Size: Large Adult   Pulse: 76   Resp: 16   Temp: 98.1 °F (36.7 °C)   TempSrc: Oral   SpO2: 98%   Weight: 178 lb (80.7 kg)   Height: 5' 10\" (1.778 m)     Body mass index is 25.54 kg/m². PHQ Scores 2020   PHQ2 Score 0 0 1 0 0 0 0   PHQ9 Score 0 0 1 0 0 0 0     Interpretation of Total Score Depression Severity: 1-4 = Minimal depression, 5-9 = Mild depression, 10-14 = Moderate depression, 15-19 = Moderately severe depression, 20-27 = Severe depression    HPI    He comes in to follow-up on his blood pressure. He is tolerating the new medication he has no headache fever chills cough nausea or vomiting. He says he is calmer than he was last time as he was anxious about taking his daughter to the airport. He does not like driving in the traffic. He has had some belching of late the only thing different in his life is he started taking multivitamins for vision but it is mostly bothers him when he gets up in the morning he has no nausea difficulty swallowing bowel or bladder problem and he is not tried any treatment for this it just started a week or 2 ago    Review of Systems    Constitutional: Negative for fatigue, fever and sweats. HEENT: Negative for eye discharge and vision loss. Negative for ear drainage, hearing loss and nasal drainage. Respiratory: Negative for cough, dyspnea and wheezing.   Cardiovascular:  Negative for chest pain, claudication and irregular heartbeat/palpitations. Gastrointestinal: Negative for abdominal pain, nausea, vomiting, constipation and diarrhea. Genitourinary: No dysuria or penile discharge. Metabolic/Endocrine: Negative for cold intolerance, heat intolerance, polydipsia and polyphagia. No unintended weight loss or gain. Neuro/Psychiatric: Negative for gait disturbance. Negative for psychiatric symptoms. Dermatologic: Negative for pruritus and rash. Musculoskeletal: positive for bone/joint symptoms. No numbness or tingling. No weakness. Hematology: Negative for bleeding and easy bruising. Immunology:  Negative for environmental allergies and food allergies. Physical Exam    Patient's medication, allergies, past medical, surgical, social and family histories were reviewed and updated as appropriate. PHYSICAL EXAM     General: Alert oriented pleasant cooperative no acute distress  Lungs: Clear to auscultation without wheezes rhonchi or rales  Heart: Regular rate and rhythm without murmurs rubs or gallops  Abdomen: Obese soft nontender no rebound guarding or masses organs not enlarged              Assessment:   Diagnosis Orders   1. Essential hypertension  hydroCHLOROthiazide (HYDRODIURIL) 25 MG tablet    Comprehensive Metabolic Panel   2. Screening for hypercholesterolemia  Lipid, Fasting   3. Belching   stop the vitamins for a week and see if his symptoms improve if not he may want to try over-the-counter antiacids however if belching persist worsens or other symptoms such as pain difficulty swallowing no weight loss occur he needs to follow-up sooner with us was explained to the patient         On this date 07/14/21 I have spent 24 minutes reviewing previous notes, test results and face to face with the patient discussing the diagnosis and importance of compliance with the treatment plan.        Plan:  Current Outpatient Medications   Medication Sig Dispense Refill    hydroCHLOROthiazide (HYDRODIURIL) 25 MG tablet Take 1 tablet by mouth every morning 90 tablet 3    Multiple Vitamins-Minerals (PRESERVISION AREDS 2+MULTI VIT) CAPS Take 1 capsule by mouth 2 times daily (with meals)      benazepril (LOTENSIN) 40 MG tablet TAKE 1 TABLET BY MOUTH  DAILY 90 tablet 3    probenecid (BENEMID) 500 MG tablet TAKE 1 TABLET BY MOUTH  DAILY 90 tablet 3    VITAMIN D PO Take 800 Units by mouth daily        No current facility-administered medications for this visit. Orders Placed This Encounter   Procedures    Comprehensive Metabolic Panel     Standing Status:   Future     Standing Expiration Date:   7/14/2022    Lipid, Fasting     Standing Status:   Future     Standing Expiration Date:   7/14/2022       Orders Placed This Encounter   Medications    hydroCHLOROthiazide (HYDRODIURIL) 25 MG tablet     Sig: Take 1 tablet by mouth every morning     Dispense:  90 tablet     Refill:  3              Return in about 6 months (around 1/14/2022). An electronic signature was used to authenticate this note.   Dr. Chandler Primrose, MD      7/14/21  1:17 PM

## 2021-07-15 DIAGNOSIS — Z13.220 SCREENING FOR HYPERCHOLESTEROLEMIA: ICD-10-CM

## 2021-07-15 DIAGNOSIS — I10 ESSENTIAL HYPERTENSION: ICD-10-CM

## 2021-07-15 DIAGNOSIS — N18.4 CHRONIC RENAL FAILURE, STAGE 4 (SEVERE) (HCC): Primary | ICD-10-CM

## 2021-07-15 LAB
ALBUMIN SERPL-MCNC: 4.2 G/DL (ref 3.5–4.6)
ALP BLD-CCNC: 67 U/L (ref 35–104)
ALT SERPL-CCNC: 8 U/L (ref 0–41)
ANION GAP SERPL CALCULATED.3IONS-SCNC: 13 MEQ/L (ref 9–15)
AST SERPL-CCNC: 17 U/L (ref 0–40)
BILIRUB SERPL-MCNC: 0.4 MG/DL (ref 0.2–0.7)
BUN BLDV-MCNC: 44 MG/DL (ref 8–23)
CALCIUM SERPL-MCNC: 9.2 MG/DL (ref 8.5–9.9)
CHLORIDE BLD-SCNC: 103 MEQ/L (ref 95–107)
CHOLESTEROL, FASTING: 171 MG/DL (ref 0–199)
CO2: 24 MEQ/L (ref 20–31)
CREAT SERPL-MCNC: 2.28 MG/DL (ref 0.7–1.2)
GFR AFRICAN AMERICAN: 33.3
GFR NON-AFRICAN AMERICAN: 27.5
GLOBULIN: 2.5 G/DL (ref 2.3–3.5)
GLUCOSE BLD-MCNC: 107 MG/DL (ref 70–99)
HDLC SERPL-MCNC: 41 MG/DL (ref 40–59)
LDL CHOLESTEROL CALCULATED: 110 MG/DL (ref 0–129)
POTASSIUM SERPL-SCNC: 5.1 MEQ/L (ref 3.4–4.9)
SODIUM BLD-SCNC: 140 MEQ/L (ref 135–144)
TOTAL PROTEIN: 6.7 G/DL (ref 6.3–8)
TRIGLYCERIDE, FASTING: 98 MG/DL (ref 0–150)

## 2021-07-15 RX ORDER — HYDROCHLOROTHIAZIDE 25 MG/1
TABLET ORAL
Qty: 90 TABLET | Refills: 3
Start: 2021-07-15 | End: 2021-08-06 | Stop reason: CLARIF

## 2021-08-02 ENCOUNTER — TELEPHONE (OUTPATIENT)
Dept: FAMILY MEDICINE CLINIC | Age: 81
End: 2021-08-02

## 2021-08-06 ENCOUNTER — OFFICE VISIT (OUTPATIENT)
Dept: FAMILY MEDICINE CLINIC | Age: 81
End: 2021-08-06
Payer: MEDICARE

## 2021-08-06 ENCOUNTER — TELEPHONE (OUTPATIENT)
Dept: FAMILY MEDICINE CLINIC | Age: 81
End: 2021-08-06

## 2021-08-06 VITALS
OXYGEN SATURATION: 99 % | DIASTOLIC BLOOD PRESSURE: 60 MMHG | RESPIRATION RATE: 18 BRPM | WEIGHT: 181 LBS | HEART RATE: 82 BPM | TEMPERATURE: 97.9 F | BODY MASS INDEX: 25.91 KG/M2 | SYSTOLIC BLOOD PRESSURE: 120 MMHG | HEIGHT: 70 IN

## 2021-08-06 DIAGNOSIS — Z87.39 HISTORY OF GOUT: ICD-10-CM

## 2021-08-06 DIAGNOSIS — E78.5 HYPERLIPIDEMIA, UNSPECIFIED HYPERLIPIDEMIA TYPE: ICD-10-CM

## 2021-08-06 DIAGNOSIS — L30.9 DERMATITIS: ICD-10-CM

## 2021-08-06 DIAGNOSIS — I10 ESSENTIAL HYPERTENSION: Primary | ICD-10-CM

## 2021-08-06 DIAGNOSIS — N17.9 AKI (ACUTE KIDNEY INJURY) (HCC): ICD-10-CM

## 2021-08-06 DIAGNOSIS — Z12.11 SCREENING FOR COLON CANCER: Primary | ICD-10-CM

## 2021-08-06 PROCEDURE — G8427 DOCREV CUR MEDS BY ELIG CLIN: HCPCS | Performed by: INTERNAL MEDICINE

## 2021-08-06 PROCEDURE — 99214 OFFICE O/P EST MOD 30 MIN: CPT | Performed by: INTERNAL MEDICINE

## 2021-08-06 PROCEDURE — 4040F PNEUMOC VAC/ADMIN/RCVD: CPT | Performed by: INTERNAL MEDICINE

## 2021-08-06 PROCEDURE — G8417 CALC BMI ABV UP PARAM F/U: HCPCS | Performed by: INTERNAL MEDICINE

## 2021-08-06 PROCEDURE — 1123F ACP DISCUSS/DSCN MKR DOCD: CPT | Performed by: INTERNAL MEDICINE

## 2021-08-06 PROCEDURE — 1036F TOBACCO NON-USER: CPT | Performed by: INTERNAL MEDICINE

## 2021-08-06 RX ORDER — CEPHALEXIN 250 MG/1
250 CAPSULE ORAL EVERY 6 HOURS
Qty: 28 CAPSULE | Refills: 0 | Status: SHIPPED | OUTPATIENT
Start: 2021-08-06 | End: 2021-08-13

## 2021-08-06 ASSESSMENT — ENCOUNTER SYMPTOMS
BACK PAIN: 0
ABDOMINAL PAIN: 0
EYE PAIN: 0
SHORTNESS OF BREATH: 0

## 2021-08-06 NOTE — TELEPHONE ENCOUNTER
Please relay the message below from dr Julián Stoddard to the patient and let me know of his answer.
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
## 2021-08-13 RX ORDER — POLYETHYLENE GLYCOL 3350, SODIUM CHLORIDE, SODIUM BICARBONATE, POTASSIUM CHLORIDE 420; 11.2; 5.72; 1.48 G/4L; G/4L; G/4L; G/4L
4000 POWDER, FOR SOLUTION ORAL ONCE
Qty: 4000 ML | Refills: 0 | Status: SHIPPED | OUTPATIENT
Start: 2021-08-13 | End: 2021-08-13

## 2021-08-29 ENCOUNTER — HOSPITAL ENCOUNTER (OUTPATIENT)
Dept: ULTRASOUND IMAGING | Age: 81
Discharge: HOME | End: 2021-08-31
Payer: MEDICARE

## 2021-08-29 DIAGNOSIS — N18.4 CHRONIC KIDNEY DISEASE, STAGE IV (SEVERE) (HCC): ICD-10-CM

## 2021-08-29 PROCEDURE — 76775 US EXAM ABDO BACK WALL LIM: CPT

## 2021-09-09 ENCOUNTER — OFFICE VISIT (OUTPATIENT)
Dept: FAMILY MEDICINE CLINIC | Age: 81
End: 2021-09-09
Payer: MEDICARE

## 2021-09-09 VITALS
SYSTOLIC BLOOD PRESSURE: 125 MMHG | OXYGEN SATURATION: 98 % | RESPIRATION RATE: 15 BRPM | WEIGHT: 178 LBS | TEMPERATURE: 98.2 F | HEART RATE: 78 BPM | DIASTOLIC BLOOD PRESSURE: 74 MMHG | HEIGHT: 70 IN | BODY MASS INDEX: 25.48 KG/M2

## 2021-09-09 DIAGNOSIS — Z87.39 HISTORY OF GOUT: ICD-10-CM

## 2021-09-09 DIAGNOSIS — Z23 ENCOUNTER FOR IMMUNIZATION: ICD-10-CM

## 2021-09-09 DIAGNOSIS — Z12.5 SCREENING FOR PROSTATE CANCER: ICD-10-CM

## 2021-09-09 DIAGNOSIS — N18.30 STAGE 3 CHRONIC KIDNEY DISEASE, UNSPECIFIED WHETHER STAGE 3A OR 3B CKD (HCC): ICD-10-CM

## 2021-09-09 DIAGNOSIS — R73.9 HYPERGLYCEMIA: ICD-10-CM

## 2021-09-09 DIAGNOSIS — D64.9 ANEMIA, UNSPECIFIED TYPE: Primary | ICD-10-CM

## 2021-09-09 DIAGNOSIS — E78.5 HYPERLIPIDEMIA, UNSPECIFIED HYPERLIPIDEMIA TYPE: ICD-10-CM

## 2021-09-09 DIAGNOSIS — Z23 NEED FOR INFLUENZA VACCINATION: ICD-10-CM

## 2021-09-09 PROCEDURE — 1036F TOBACCO NON-USER: CPT | Performed by: INTERNAL MEDICINE

## 2021-09-09 PROCEDURE — G0008 ADMIN INFLUENZA VIRUS VAC: HCPCS | Performed by: INTERNAL MEDICINE

## 2021-09-09 PROCEDURE — 1123F ACP DISCUSS/DSCN MKR DOCD: CPT | Performed by: INTERNAL MEDICINE

## 2021-09-09 PROCEDURE — 90694 VACC AIIV4 NO PRSRV 0.5ML IM: CPT | Performed by: INTERNAL MEDICINE

## 2021-09-09 PROCEDURE — G8417 CALC BMI ABV UP PARAM F/U: HCPCS | Performed by: INTERNAL MEDICINE

## 2021-09-09 PROCEDURE — 99214 OFFICE O/P EST MOD 30 MIN: CPT | Performed by: INTERNAL MEDICINE

## 2021-09-09 PROCEDURE — G8427 DOCREV CUR MEDS BY ELIG CLIN: HCPCS | Performed by: INTERNAL MEDICINE

## 2021-09-09 PROCEDURE — 4040F PNEUMOC VAC/ADMIN/RCVD: CPT | Performed by: INTERNAL MEDICINE

## 2021-09-09 RX ORDER — HYDROCHLOROTHIAZIDE 25 MG/1
TABLET ORAL
COMMUNITY
Start: 2021-08-19 | End: 2021-09-09

## 2021-09-09 RX ORDER — ZOSTER VACCINE RECOMBINANT, ADJUVANTED 50 MCG/0.5
0.5 KIT INTRAMUSCULAR SEE ADMIN INSTRUCTIONS
Qty: 0.5 ML | Refills: 0 | Status: SHIPPED | OUTPATIENT
Start: 2021-09-09 | End: 2021-10-21 | Stop reason: CLARIF

## 2021-09-09 ASSESSMENT — ENCOUNTER SYMPTOMS
ABDOMINAL PAIN: 0
BACK PAIN: 0
EYE PAIN: 0
SHORTNESS OF BREATH: 0

## 2021-09-09 NOTE — PROGRESS NOTES
Vaccine Information Sheet, \"Influenza - Inactivated\"  given to Blayne Sloop, or parent/legal guardian of  Blayne Mujica and verbalized understanding. Patient responses:    Have you ever had a reaction to a flu vaccine? No  Are you able to eat eggs without adverse effects? Yes  Do you have any current illness? No  Have you ever had Guillian Walnut Syndrome? No    Flu vaccine given per order. Please see immunization tab.
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
## 2021-09-16 ENCOUNTER — HOSPITAL ENCOUNTER (OUTPATIENT)
Age: 81
Setting detail: OUTPATIENT SURGERY
Discharge: HOME | End: 2021-09-16
Attending: INTERNAL MEDICINE | Admitting: INTERNAL MEDICINE
Payer: MEDICARE

## 2021-09-16 ENCOUNTER — ANESTHESIA EVENT (OUTPATIENT)
Dept: ENDOSCOPY | Age: 81
End: 2021-09-16
Payer: MEDICARE

## 2021-09-16 ENCOUNTER — ANESTHESIA (OUTPATIENT)
Dept: ENDOSCOPY | Age: 81
End: 2021-09-16
Payer: MEDICARE

## 2021-09-16 ENCOUNTER — ANCILLARY PROCEDURE (OUTPATIENT)
Dept: ENDOSCOPY | Age: 81
End: 2021-09-16
Attending: INTERNAL MEDICINE
Payer: MEDICARE

## 2021-09-16 VITALS
OXYGEN SATURATION: 100 % | TEMPERATURE: 96.4 F | WEIGHT: 178 LBS | HEIGHT: 70 IN | RESPIRATION RATE: 16 BRPM | DIASTOLIC BLOOD PRESSURE: 72 MMHG | HEART RATE: 60 BPM | BODY MASS INDEX: 25.48 KG/M2 | SYSTOLIC BLOOD PRESSURE: 150 MMHG

## 2021-09-16 VITALS
OXYGEN SATURATION: 98 % | DIASTOLIC BLOOD PRESSURE: 61 MMHG | SYSTOLIC BLOOD PRESSURE: 117 MMHG | RESPIRATION RATE: 12 BRPM

## 2021-09-16 PROCEDURE — 3609027000 HC COLONOSCOPY: Performed by: INTERNAL MEDICINE

## 2021-09-16 PROCEDURE — 6370000000 HC RX 637 (ALT 250 FOR IP): Performed by: INTERNAL MEDICINE

## 2021-09-16 PROCEDURE — 6360000002 HC RX W HCPCS: Performed by: NURSE ANESTHETIST, CERTIFIED REGISTERED

## 2021-09-16 PROCEDURE — 45385 COLONOSCOPY W/LESION REMOVAL: CPT | Performed by: INTERNAL MEDICINE

## 2021-09-16 PROCEDURE — 3700000000 HC ANESTHESIA ATTENDED CARE: Performed by: INTERNAL MEDICINE

## 2021-09-16 PROCEDURE — 2500000003 HC RX 250 WO HCPCS: Performed by: NURSE ANESTHETIST, CERTIFIED REGISTERED

## 2021-09-16 PROCEDURE — 7100000010 HC PHASE II RECOVERY - FIRST 15 MIN: Performed by: INTERNAL MEDICINE

## 2021-09-16 PROCEDURE — 7100000011 HC PHASE II RECOVERY - ADDTL 15 MIN: Performed by: INTERNAL MEDICINE

## 2021-09-16 PROCEDURE — 88305 TISSUE EXAM BY PATHOLOGIST: CPT

## 2021-09-16 PROCEDURE — 2709999900 HC NON-CHARGEABLE SUPPLY: Performed by: INTERNAL MEDICINE

## 2021-09-16 PROCEDURE — 3700000001 HC ADD 15 MINUTES (ANESTHESIA): Performed by: INTERNAL MEDICINE

## 2021-09-16 PROCEDURE — 2580000003 HC RX 258: Performed by: INTERNAL MEDICINE

## 2021-09-16 RX ORDER — MAGNESIUM HYDROXIDE 1200 MG/15ML
LIQUID ORAL PRN
Status: DISCONTINUED | OUTPATIENT
Start: 2021-09-16 | End: 2021-09-16 | Stop reason: ALTCHOICE

## 2021-09-16 RX ORDER — SODIUM CHLORIDE 9 MG/ML
INJECTION, SOLUTION INTRAVENOUS
Status: DISCONTINUED
Start: 2021-09-16 | End: 2021-09-16 | Stop reason: HOSPADM

## 2021-09-16 RX ORDER — 0.9 % SODIUM CHLORIDE 0.9 %
500 INTRAVENOUS SOLUTION INTRAVENOUS ONCE
Status: COMPLETED | OUTPATIENT
Start: 2021-09-16 | End: 2021-09-16

## 2021-09-16 RX ORDER — LIDOCAINE HYDROCHLORIDE 20 MG/ML
INJECTION, SOLUTION INFILTRATION; PERINEURAL PRN
Status: DISCONTINUED | OUTPATIENT
Start: 2021-09-16 | End: 2021-09-16 | Stop reason: SDUPTHER

## 2021-09-16 RX ORDER — PROPOFOL 10 MG/ML
INJECTION, EMULSION INTRAVENOUS PRN
Status: DISCONTINUED | OUTPATIENT
Start: 2021-09-16 | End: 2021-09-16 | Stop reason: SDUPTHER

## 2021-09-16 RX ADMIN — PROPOFOL 300 MG: 10 INJECTION, EMULSION INTRAVENOUS at 13:00

## 2021-09-16 RX ADMIN — LIDOCAINE HYDROCHLORIDE 60 MG: 20 INJECTION, SOLUTION INFILTRATION; PERINEURAL at 13:00

## 2021-09-16 RX ADMIN — SODIUM CHLORIDE 500 ML: 9 INJECTION, SOLUTION INTRAVENOUS at 12:47

## 2021-09-16 ASSESSMENT — PULMONARY FUNCTION TESTS
PIF_VALUE: 1

## 2021-09-16 NOTE — ANESTHESIA PRE PROCEDURE
Department of Anesthesiology  Preprocedure Note       Name:  Christy Patel   Age:  78 y.o.  :  1940                                          MRN:  [id number]         Date:  2021      Surgeon: Rosario Marley):  Mary Jo Garcia MD    Procedure: Procedure(s):  COLORECTAL CANCER SCREENING, HIGH RISK    Medications prior to admission:   Prior to Admission medications    Medication Sig Start Date End Date Taking? Authorizing Provider   zoster recombinant adjuvanted vaccine [facility]) 50 MCG/0.5ML SUSR injection Inject 0.5 mLs into the muscle See Admin Instructions 1 dose now and repeat in 2-6 months 9/9/21 3/8/22  Ar Perez MD   Multiple Vitamins-Minerals (PRESERVISION AREDS 2+MULTI VIT PO) Take by mouth    Historical Provider, MD   benazepril (LOTENSIN) 40 MG tablet TAKE 1 TABLET BY MOUTH  DAILY 21   Jesus Friday, MD   probenecid (BENEMID) 500 MG tablet TAKE 1 TABLET BY MOUTH  DAILY 21   Jesus Friday, MD   VITAMIN D PO Take 800 Units by mouth daily     Historical Provider, MD       Current medications:    No current facility-administered medications for this encounter. Allergies:     Allergies   Allergen Reactions    Altace [Ramipril]     Ramipril Other (See Comments)    Uloric [Febuxostat] Other (See Comments)       Problem List:    Patient Active Problem List   Diagnosis Code    Gout M10.9    Arthritis M19.90    Prostate cancer ([facility] [address] C61    Chronic renal failure, stage 3a ([facility] N18.31    Essential hypertension I10       Past Medical History:        Diagnosis Date    Adenomatous polyp     Allergic rhinitis     Arthritis     of the first and the second MP joints of the Right foot     Basal cell carcinoma of nose 2015    Chronic kidney disease, stage III (moderate) (HCC)     Chronic renal failure, stage 3a (HCC)     Colon polyps     Edema     Chronic Right leg    Elevated BUN     Gout     HTN (hypertension)     Hyperuricemia     Kidney disease     3    Lung nodule 09/02/2021       CMP:   Lab Results   Component Value Date     09/02/2021    K 5.0 09/02/2021     09/02/2021    CO2 25 09/02/2021    BUN 35 09/02/2021    CREATININE 1.79 09/02/2021    GFRAA 44.0 09/02/2021    LABGLOM 36.4 09/02/2021    GLUCOSE 108 09/02/2021    PROT 6.8 09/02/2021    CALCIUM 9.3 09/02/2021    BILITOT 0.4 09/02/2021    ALKPHOS 65 09/02/2021    AST 17 09/02/2021    ALT 9 09/02/2021       POC Tests: No results for input(s): POCGLU, POCNA, POCK, POCCL, POCBUN, POCHEMO, POCHCT in the last 72 hours. Coags: No results found for: PROTIME, INR, APTT    HCG (If Applicable): No results found for: PREGTESTUR, PREGSERUM, HCG, HCGQUANT     ABGs: No results found for: PHART, PO2ART, DUB3TXA, QUB3MWW, BEART, U5GUXZJP     Type & Screen (If Applicable):  No results found for: LABABO, LABRH    Drug/Infectious Status (If Applicable):  No results found for: HIV, HEPCAB    COVID-19 Screening (If Applicable): No results found for: COVID19        Anesthesia Evaluation    Airway: Mallampati: II  TM distance: >3 FB   Neck ROM: full  Mouth opening: > = 3 FB Dental:          Pulmonary:Negative Pulmonary ROS and normal exam                               Cardiovascular:    (+) hypertension:,         Rhythm: regular  Rate: normal                    Neuro/Psych:   Negative Neuro/Psych ROS              GI/Hepatic/Renal:   (+) renal disease: ESRD, bowel prep,           Endo/Other: Negative Endo/Other ROS   (+) : arthritis: OA., .                 Abdominal:             Vascular: negative vascular ROS. Other Findings:             Anesthesia Plan      MAC     ASA 3       Induction: intravenous. Anesthetic plan and risks discussed with patient. Plan discussed with attending.                   DESMOND Norton - CRNA   9/16/2021

## 2021-09-16 NOTE — ANESTHESIA POSTPROCEDURE EVALUATION
Department of Anesthesiology  Postprocedure Note    Patient: Austyn Anderson  MRN: [id number]  YOB: 1940  Date of evaluation: 9/16/2021  Time:  1:26 PM     Procedure Summary     Date: 09/16/21 Room / Location: [address] [place] [address] [facility]    Anesthesia Start: 1257 Anesthesia Stop: 8921    Procedure: COLORECTAL CANCER SCREENING, HIGH RISK (N/A ) Diagnosis: (Screening, hx polyps)    Surgeons: Parisa Cuellar MD Responsible Provider: DESMOND Rios CRNA    Anesthesia Type: MAC ASA Status: 3          Anesthesia Type: MAC    Bethany Phase I: Bethany Score: 10    Bethany Phase II:      Last vitals: Reviewed and per EMR flowsheets.        Anesthesia Post Evaluation    Patient location during evaluation: bedside  Patient participation: complete - patient participated  Level of consciousness: awake and awake and alert  Pain score: 0  Airway patency: patent  Nausea & Vomiting: no nausea and no vomiting  Complications: no  Cardiovascular status: blood pressure returned to baseline and hemodynamically stable  Respiratory status: acceptable and spontaneous ventilation  Hydration status: euvolemic
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
## 2021-10-21 ENCOUNTER — VIRTUAL VISIT (OUTPATIENT)
Dept: FAMILY MEDICINE CLINIC | Age: 81
End: 2021-10-21
Payer: MEDICARE

## 2021-10-21 DIAGNOSIS — R77.8 ABNORMAL SERUM PROTEIN ELECTROPHORESIS: Primary | ICD-10-CM

## 2021-10-21 DIAGNOSIS — R76.8 ELEVATED SERUM IMMUNOGLOBULIN FREE LIGHT CHAINS: ICD-10-CM

## 2021-10-21 PROCEDURE — 99442 PR PHYS/QHP TELEPHONE EVALUATION 11-20 MIN: CPT | Performed by: INTERNAL MEDICINE

## 2021-10-21 ASSESSMENT — ENCOUNTER SYMPTOMS
ABDOMINAL PAIN: 0
EYE PAIN: 0
BACK PAIN: 0
SHORTNESS OF BREATH: 0
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
## 2021-10-25 DIAGNOSIS — R77.8 ABNORMAL SERUM PROTEIN ELECTROPHORESIS: ICD-10-CM

## 2021-10-28 LAB
IGA: 129 MG/DL (ref 68–408)
IGG: 844 MG/DL (ref 768–1632)
IGM: 119 MG/DL (ref 35–263)
KAPPA FREE LIGHT CHAINS QNT: 55.95 MG/L (ref 3.3–19.4)
KAPPA/LAMBDA FREE LIGHT CHAIN RATIO: 2.46 (ref 0.26–1.65)
LAMBDA FREE LIGHT CHAINS QNT: 22.72 MG/L (ref 5.71–26.3)

## 2021-10-29 LAB
HOURS COLLECTED: 24 HR
IFE INTERPRETATION:U: NORMAL
KAPPA QUANT FREE LIGHT CHAINS: 23.37 MG/L (ref 0–32.9)
LAMBDA FREE LIGHT CHAINS URINE/ VOL: 2.63 MG/L (ref 0–3.79)
PROTEIN PROTEIN: NORMAL MG/D
URINE FREE KAPPA EXCRETION/DAY: 46.74 MG/D
URINE FREE LAMBDA EXCRETION/DAY: 5.26 MG/D
URINE TOTAL VOLUME: 2000 ML

## 2021-12-30 DIAGNOSIS — I10 ESSENTIAL HYPERTENSION: ICD-10-CM

## 2021-12-30 DIAGNOSIS — E79.0 HYPERURICEMIA: ICD-10-CM

## 2021-12-30 RX ORDER — PROBENECID 500 MG/1
500 TABLET, FILM COATED ORAL DAILY
Qty: 90 TABLET | Refills: 3 | Status: SHIPPED | OUTPATIENT
Start: 2021-12-30 | End: 2022-07-12

## 2021-12-30 RX ORDER — BENAZEPRIL HYDROCHLORIDE 40 MG/1
40 TABLET, FILM COATED ORAL DAILY
Qty: 90 TABLET | Refills: 3 | Status: SHIPPED | OUTPATIENT
Start: 2021-12-30

## 2021-12-30 NOTE — TELEPHONE ENCOUNTER
requesting medication refill.      Rx requested:  Requested Prescriptions      No prescriptions requested or ordered in this encounter       Last Office Visit:   10/21/2021    Last Tox screen:        Last Medication contract:        Next Visit Date:  Future Appointments   Date Time Provider [place] Juli   1/12/2022 11:00 AM Smiley Donaldson  [place], [place] [address]

## 2022-01-12 ENCOUNTER — OFFICE VISIT (OUTPATIENT)
Dept: FAMILY MEDICINE CLINIC | Age: 82
End: 2022-01-12
Payer: MEDICARE

## 2022-01-12 ENCOUNTER — TELEPHONE (OUTPATIENT)
Dept: FAMILY MEDICINE CLINIC | Age: 82
End: 2022-01-12

## 2022-01-12 VITALS
BODY MASS INDEX: 25.05 KG/M2 | DIASTOLIC BLOOD PRESSURE: 70 MMHG | HEIGHT: 70 IN | HEART RATE: 67 BPM | WEIGHT: 175 LBS | SYSTOLIC BLOOD PRESSURE: 136 MMHG | OXYGEN SATURATION: 98 % | TEMPERATURE: 98.4 F

## 2022-01-12 DIAGNOSIS — C61 PROSTATE CANCER (HCC): ICD-10-CM

## 2022-01-12 DIAGNOSIS — N18.30 STAGE 3 CHRONIC KIDNEY DISEASE, UNSPECIFIED WHETHER STAGE 3A OR 3B CKD (HCC): ICD-10-CM

## 2022-01-12 PROCEDURE — 1036F TOBACCO NON-USER: CPT | Performed by: INTERNAL MEDICINE

## 2022-01-12 PROCEDURE — G8427 DOCREV CUR MEDS BY ELIG CLIN: HCPCS | Performed by: INTERNAL MEDICINE

## 2022-01-12 PROCEDURE — 4040F PNEUMOC VAC/ADMIN/RCVD: CPT | Performed by: INTERNAL MEDICINE

## 2022-01-12 PROCEDURE — 99213 OFFICE O/P EST LOW 20 MIN: CPT | Performed by: INTERNAL MEDICINE

## 2022-01-12 PROCEDURE — G8417 CALC BMI ABV UP PARAM F/U: HCPCS | Performed by: INTERNAL MEDICINE

## 2022-01-12 PROCEDURE — 1123F ACP DISCUSS/DSCN MKR DOCD: CPT | Performed by: INTERNAL MEDICINE

## 2022-01-12 PROCEDURE — G8484 FLU IMMUNIZE NO ADMIN: HCPCS | Performed by: INTERNAL MEDICINE

## 2022-01-12 SDOH — ECONOMIC STABILITY: FOOD INSECURITY: WITHIN THE PAST 12 MONTHS, YOU WORRIED THAT YOUR FOOD WOULD RUN OUT BEFORE YOU GOT MONEY TO BUY MORE.: NEVER TRUE

## 2022-01-12 SDOH — ECONOMIC STABILITY: FOOD INSECURITY: WITHIN THE PAST 12 MONTHS, THE FOOD YOU BOUGHT JUST DIDN'T LAST AND YOU DIDN'T HAVE MONEY TO GET MORE.: NEVER TRUE

## 2022-01-12 ASSESSMENT — ENCOUNTER SYMPTOMS
ABDOMINAL PAIN: 0
SHORTNESS OF BREATH: 0
BACK PAIN: 0
EYE PAIN: 0

## 2022-01-12 ASSESSMENT — SOCIAL DETERMINANTS OF HEALTH (SDOH): HOW HARD IS IT FOR YOU TO PAY FOR THE VERY BASICS LIKE FOOD, HOUSING, MEDICAL CARE, AND HEATING?: NOT VERY HARD

## 2022-01-12 NOTE — PATIENT INSTRUCTIONS
Patient Education        allopurinol  Pronunciation:  AL oh PURE i nol  Brand: Aloprim, Zyloprim  What is the most important information I should know about allopurinol? You should not use this medicine if you have ever had a serious allergic reaction to allopurinol. Stop taking the medicine and call your doctor at once if you have any signs of skin rash (no matter how mild), painful urination, blood in your urine, burning in your eyes, or swelling in your face or throat. What is allopurinol? Allopurinol reduces the production of uric acid in the body. Allopurinol is used to treat gout or kidney stones. Allopurinol is also used to decrease levels of uric acid in people who are receiving cancer treatment. Allopurinol may also be used for purposes not listed in this medication guide. What should I discuss with my healthcare provider before using allopurinol? You should not use allopurinol if you are allergic to it. Tell your doctor if you have ever had:  · kidney disease;  · liver disease;  · diabetes;  · congestive heart failure;  · high blood pressure;  · if you are receiving chemotherapy; or  · if you have a gene variation called HLA-B*58:01 allele (your doctor will test you for this). Tell your doctor if you are pregnant or breastfeeding. Do not give this medicine to a child without medical advice. How should I use allopurinol? Follow all directions on your prescription label and read all medication guides or instruction sheets. Your doctor may occasionally change your dose. Use the medicine exactly as directed. Allopurinol oral is taken by mouth. Take allopurinol oral with a full glass of water. To reduce your risk of kidney stones forming, drink 8 to 10 full glasses of fluid every day, unless your doctor tells you otherwise. Take allopurinol oral after a meal if allopurinol upsets your stomach.   Allopurinol injection is given as an infusion into a vein if you are unable to take the medicine by mouth. A healthcare provider will give your first dose and may teach you how to properly use the medication by yourself. You may need to mix allopurinol injection with a liquid (diluent) in an IV bag. When using injections by yourself, be sure you understand how to properly mix and store the medicine. Ask your doctor or pharmacist if you don't understand all instructions. Prepare an injection only when you are ready to give it. Do not use if the medicine has changed colors or has particles in it. Call your pharmacist for new medicine. You may need frequent medical tests. Even if you have no symptoms, tests can help your doctor determine if this medicine is effective. You may have gout attacks more often when you first start taking allopurinol oral. Your doctor may recommend other gout medication to take with allopurinol. Keep taking the medicine as directed. It may take 2 to 6 weeks before you have fewer gout attacks. Call your doctor if your symptoms do not improve after 6 weeks. You may need to follow a special diet to help prevent kidney stones. Follow all instructions of your doctor or dietitian. Learn about the foods to eat or avoid. Store at room temperature away from moisture and heat. What happens if I miss a dose? Take the allopurinol oral dose as soon as you can, but skip the missed dose if it is almost time for your next dose. Do not take two doses at one time. Call your doctor for instructions if you miss an allopurinol injection. What happens if I overdose? Seek emergency medical attention or call the Poison Help line at 1-241.261.1482. What should I avoid while using allopurinol? Avoid driving or hazardous activity until you know how this medicine will affect you. Your reactions could be impaired. Avoid drinking alcohol. It may worsen your condition. What are the possible side effects of allopurinol?   Stop using this medicine and get emergency medical help if you have signs of an allergic reaction (hives, difficult breathing, swelling in your face or throat) or a severe skin reaction (fever, sore throat, burning eyes, skin pain, red or purple skin rash with blistering and peeling). Seek medical treatment if you have a serious drug reaction that can affect many parts of your body. Symptoms may include: skin rash, fever, swollen glands, muscle aches, severe weakness, unusual bruising, or yellowing of your skin or eyes. Stop using allopurinol and call your doctor at once if you have:  · any skin rash, no matter how mild;  · painful urination, blood in the urine;  · little or no urination;  · easy bruising, unusual bleeding;  · numbness, tingling, burning pain;  · worsening gout symptoms; or  · liver problems --loss of appetite, weight loss, stomach pain (upper right side), itching, dark urine, sarai-colored stools, jaundice (yellowing of the skin or eyes). Common side effects may include:  · an increase in gout attacks when you first starting taking allopurinol oral;  · rash;  · drowsiness;  · fever, chills;  · abnormal liver function tests;  · nausea, diarrhea; or  · joint pain. This is not a complete list of side effects and others may occur. Call your doctor for medical advice about side effects. You may report side effects to FDA at 3-827-FDA-4788. What other drugs will affect allopurinol? Tell your doctor about all your other medicines, especially:  · azathioprine or mercaptopurine;  · chlorpropamide;  · cyclosporine;  · an antibiotic such as ampicillin or amoxicillin;  · a blood thinner such as warfarin, Coumadin, Wynell Pluck; or  · a diuretic or \"water pill\". This list is not complete. Other drugs may affect allopurinol, including prescription and over-the-counter medicines, vitamins, and herbal products. Not all possible drug interactions are listed here. Where can I get more information? Your pharmacist can provide more information about allopurinol.   Remember, keep this and all other medicines out of the reach of children, never share your medicines with others, and use this medication only for the indication prescribed. Every effort has been made to ensure that the information provided by [address] is accurate, up-to-date, and complete, but no guarantee is made to that effect. Drug information contained herein may be time sensitive. [facility] information has been compiled for use by healthcare practitioners and consumers in the [facility] and therefore [facility] does not warrant that uses outside of the [facility] are appropriate, unless specifically indicated otherwise. [facility]'s drug information does not endorse drugs, diagnose patients or recommend therapy. [facility]'s drug information is an informational resource designed to assist licensed healthcare practitioners in caring for their patients and/or to serve consumers viewing this service as a supplement to, and not a substitute for, the expertise, skill, knowledge and judgment of healthcare practitioners. The absence of a warning for a given drug or drug combination in no way should be construed to indicate that the drug or drug combination is safe, effective or appropriate for any given patient. [facility] does not assume any responsibility for any aspect of healthcare administered with the aid of information [facility] provides. The information contained herein is not intended to cover all possible uses, directions, precautions, warnings, drug interactions, allergic reactions, or adverse effects. If you have questions about the drugs you are taking, check with your doctor, nurse or pharmacist.  Copyright 3352-9438 [address]: 7.01. Revision date: 1/29/2021. Care instructions adapted under license by [facility] ([facility]).  If you have questions about a medical condition or this instruction, always ask your healthcare professional. [place] [address] any warranty or liability for your use of this information.
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
## 2022-01-12 NOTE — TELEPHONE ENCOUNTER
I am not sure why we did not communicate with him after the results of the polyp. We contact all patients regarding the results. I will look into finding out why we did not connect with him. He had tubular adenomas and serrated adenomas, current guidelines recommend surveillance in 5 years, however given his advanced age, my recommendation for him would be to not do any further surveillance, of course age, clinical situation and patient preference being taken into consideration.     Thanks  Dolly Cason MD

## 2022-01-20 NOTE — TELEPHONE ENCOUNTER
Dear Jazmine Schilling MD     In order to reduce the risk of allopurinol hypersensitivity syndrome (AHS) in patients with altered kidney function initial dose of allopurinol should be <100mg daily according to [employer]. Suggested initial doses of allopurinol in altered kidney function:    eGFR >30-60: 50mg daily  eGFR >15 to 30: 50mg every other day  5 to 15: 50mg twice weekly  <5 50mg once weekly    Please feel free to reach out with any further questions.     Kahlil [address] // Department, toll free [phone], Option 1     =======================================================  For Pharmacy Admin Tracking Only     Gap Closed?: Yes    Time Spent (min): 20

## 2022-01-21 RX ORDER — ALLOPURINOL 100 MG/1
50 TABLET ORAL DAILY
Qty: 30 TABLET | Refills: 0 | Status: SHIPPED | OUTPATIENT
Start: 2022-01-21 | End: 2022-03-19

## 2022-01-21 NOTE — TELEPHONE ENCOUNTER
Pend allopurinol at 50 mg once daily number 30, 0 refills   He should take probenecid at every other day doses for the first 30 days then stop it completely

## 2022-03-09 DIAGNOSIS — Z23 NEED FOR INFLUENZA VACCINATION: ICD-10-CM

## 2022-03-09 DIAGNOSIS — E78.5 HYPERLIPIDEMIA, UNSPECIFIED HYPERLIPIDEMIA TYPE: ICD-10-CM

## 2022-03-09 DIAGNOSIS — Z87.39 HISTORY OF GOUT: ICD-10-CM

## 2022-03-09 DIAGNOSIS — Z12.5 SCREENING FOR PROSTATE CANCER: ICD-10-CM

## 2022-03-09 DIAGNOSIS — D64.9 ANEMIA, UNSPECIFIED TYPE: ICD-10-CM

## 2022-03-09 DIAGNOSIS — R73.9 HYPERGLYCEMIA: ICD-10-CM

## 2022-03-09 DIAGNOSIS — N18.30 STAGE 3 CHRONIC KIDNEY DISEASE, UNSPECIFIED WHETHER STAGE 3A OR 3B CKD (HCC): ICD-10-CM

## 2022-03-09 LAB
ALBUMIN SERPL-MCNC: 4.3 G/DL (ref 3.5–4.6)
ALP BLD-CCNC: 66 U/L (ref 35–104)
ALT SERPL-CCNC: 11 U/L (ref 0–41)
ANION GAP SERPL CALCULATED.3IONS-SCNC: 15 MEQ/L (ref 9–15)
AST SERPL-CCNC: 20 U/L (ref 0–40)
BASOPHILS ABSOLUTE: 0.1 K/UL (ref 0–0.2)
BASOPHILS RELATIVE PERCENT: 1 %
BILIRUB SERPL-MCNC: 0.3 MG/DL (ref 0.2–0.7)
BUN BLDV-MCNC: 27 MG/DL (ref 8–23)
CALCIUM SERPL-MCNC: 8.9 MG/DL (ref 8.5–9.9)
CHLORIDE BLD-SCNC: 103 MEQ/L (ref 95–107)
CHOLESTEROL, TOTAL: 176 MG/DL (ref 0–199)
CO2: 25 MEQ/L (ref 20–31)
CREAT SERPL-MCNC: 1.59 MG/DL (ref 0.7–1.2)
EOSINOPHILS ABSOLUTE: 0.2 K/UL (ref 0–0.7)
EOSINOPHILS RELATIVE PERCENT: 3.8 %
GFR AFRICAN AMERICAN: 50.4
GFR NON-AFRICAN AMERICAN: 41.7
GLOBULIN: 2.8 G/DL (ref 2.3–3.5)
GLUCOSE BLD-MCNC: 112 MG/DL (ref 70–99)
HBA1C MFR BLD: 5.7 % (ref 4.8–5.9)
HCT VFR BLD CALC: 42.8 % (ref 42–52)
HDLC SERPL-MCNC: 56 MG/DL (ref 40–59)
HEMOGLOBIN: 13.8 G/DL (ref 14–18)
LDL CHOLESTEROL CALCULATED: 110 MG/DL (ref 0–129)
LYMPHOCYTES ABSOLUTE: 1.3 K/UL (ref 1–4.8)
LYMPHOCYTES RELATIVE PERCENT: 24.6 %
MCH RBC QN AUTO: 30.2 PG (ref 27–31.3)
MCHC RBC AUTO-ENTMCNC: 32.3 % (ref 33–37)
MCV RBC AUTO: 93.6 FL (ref 80–100)
MONOCYTES ABSOLUTE: 0.8 K/UL (ref 0.2–0.8)
MONOCYTES RELATIVE PERCENT: 14.5 %
NEUTROPHILS ABSOLUTE: 2.9 K/UL (ref 1.4–6.5)
NEUTROPHILS RELATIVE PERCENT: 56.1 %
PDW BLD-RTO: 14.5 % (ref 11.5–14.5)
PLATELET # BLD: 176 K/UL (ref 130–400)
POTASSIUM SERPL-SCNC: 4.5 MEQ/L (ref 3.4–4.9)
PROSTATE SPECIFIC ANTIGEN: 2.06 NG/ML (ref 0–4)
RBC # BLD: 4.57 M/UL (ref 4.7–6.1)
SODIUM BLD-SCNC: 143 MEQ/L (ref 135–144)
TOTAL PROTEIN: 7.1 G/DL (ref 6.3–8)
TRIGL SERPL-MCNC: 49 MG/DL (ref 0–150)
TSH REFLEX: 0.88 UIU/ML (ref 0.44–3.86)
URIC ACID, SERUM: 7 MG/DL (ref 3.4–7)
WBC # BLD: 5.2 K/UL (ref 4.8–10.8)

## 2022-03-10 LAB — VITAMIN D 25-HYDROXY: 59.4 NG/ML

## 2022-03-19 RX ORDER — ALLOPURINOL 100 MG/1
100 TABLET ORAL DAILY
Qty: 30 TABLET | Refills: 0 | Status: SHIPPED | OUTPATIENT
Start: 2022-03-19 | End: 2022-06-09 | Stop reason: SDUPTHER

## 2022-03-19 NOTE — TELEPHONE ENCOUNTER
Future Appointments    Encounter Information    Provider Department Appt Notes   7/12/2022 Leigha Suresh MD [facility] AT [facility] 6 mo [facility].       Past Visits    Date Provider Specialty Visit Type Primary Dx   01/12/2022 Leigha Suresh MD Family Medicine Office Visit Prostate cancer [facility])

## 2022-03-20 RX ORDER — ALLOPURINOL 100 MG/1
100 TABLET ORAL DAILY
Qty: 90 TABLET | OUTPATIENT
Start: 2022-03-20

## 2022-04-22 ENCOUNTER — TELEMEDICINE (OUTPATIENT)
Dept: FAMILY MEDICINE CLINIC | Age: 82
End: 2022-04-22
Payer: MEDICARE

## 2022-04-22 DIAGNOSIS — Z00.00 MEDICARE ANNUAL WELLNESS VISIT, SUBSEQUENT: Primary | ICD-10-CM

## 2022-04-22 DIAGNOSIS — Z00.00 ENCOUNTER FOR ANNUAL WELLNESS VISIT (AWV) IN MEDICARE PATIENT: ICD-10-CM

## 2022-04-22 PROCEDURE — G0439 PPPS, SUBSEQ VISIT: HCPCS | Performed by: INTERNAL MEDICINE

## 2022-04-22 PROCEDURE — 4040F PNEUMOC VAC/ADMIN/RCVD: CPT | Performed by: INTERNAL MEDICINE

## 2022-04-22 PROCEDURE — 1123F ACP DISCUSS/DSCN MKR DOCD: CPT | Performed by: INTERNAL MEDICINE

## 2022-04-22 ASSESSMENT — PATIENT HEALTH QUESTIONNAIRE - PHQ9
1. LITTLE INTEREST OR PLEASURE IN DOING THINGS: 0
SUM OF ALL RESPONSES TO PHQ QUESTIONS 1-9: 0
SUM OF ALL RESPONSES TO PHQ QUESTIONS 1-9: 0
SUM OF ALL RESPONSES TO PHQ9 QUESTIONS 1 & 2: 0
SUM OF ALL RESPONSES TO PHQ QUESTIONS 1-9: 0
SUM OF ALL RESPONSES TO PHQ QUESTIONS 1-9: 0
2. FEELING DOWN, DEPRESSED OR HOPELESS: 0

## 2022-04-22 NOTE — PROGRESS NOTES
Medicare Annual Wellness Visit    Carlos Peck is here for No chief complaint on file. Assessment & Plan   Encounter for annual wellness visit (AWV) in Medicare patient      Recommendations for Preventive Services Due: see orders and patient instructions/AVS.  Recommended screening schedule for the next 5-10 years is provided to the patient in written form: see Patient Instructions/AVS.     Return in about 3 months (around 7/22/2022) for Chronic condition management/appointment, worsening symptoms, call ASAP for appointment. Subjective       Patient's complete Health Risk Assessment and screening values have been reviewed and are found in Flowsheets. The following problems were reviewed today and where indicated follow up appointments were made and/or referrals ordered. Positive Risk Factor Screenings with Interventions:                  No Positive Risk Factors identified today. Objective      Patient-Reported Vitals  No data recorded           Allergies   Allergen Reactions    Altace [Ramipril]     Ramipril Other (See Comments)    Uloric [Febuxostat] Other (See Comments)     Prior to Visit Medications    Medication Sig Taking? Authorizing Provider   allopurinol (ZYLOPRIM) 100 MG tablet Take 1 tablet by mouth daily  Sonia Gilliland MD   benazepril (LOTENSIN) 40 MG tablet Take 1 tablet by mouth daily  Sonia Gilliland MD   probenecid (BENEMID) 500 MG tablet Take 1 tablet by mouth daily  Sonia Gilliland MD   Multiple Vitamins-Minerals (PRESERVISION AREDS 2+MULTI VIT PO) Take by mouth  Historical Provider, MD   VITAMIN D PO Take 800 Units by mouth daily   Historical Provider, MD     No falls in the last year  Last eye exam 08/2021, jan 2022 he had MD check with opthalmologic, he plans to follow up with OD in 08/2022  He goes twice a year to the dentist. He probably goes q4 months actually to help with extra [place] control.    Chronic hear loss, doesn't have hearing aids, declining ent referral.   Exercising, but not 150 minutes per week, he will work on this. Does have throw rugs (they're heavy and not sliding around) remove or fasten down. Denies clutter on stair ways  Mild memory loss. He does read and do cross word puzzles etc to keep his mind sharp. Has railings in all of his stair ways  Has railings in tub, has shower chair, he plans on having more permament railings installed. Has working smoke detectors (doesn't have CO detectors) please have this installed. Always wears seat belt  Doesn't have living will or poa forms, he declined acp referral  He will take care of this himself. FULL CODE    CareTeam (Including outside providers/suppliers regularly involved in providing care):   Patient Care Team:  Lesley Wells MD as PCP - General (Internal Medicine)  Lesley Wells MD as PCP [facility]    Reviewed and updated this visit:            Perla Shannon, was evaluated through a synchronous (real-time) audio-video encounter. The patient (or guardian if applicable) is aware that this is a billable service, which includes applicable co-pays. This Virtual Visit was conducted with patient's (and/or legal guardian's) consent. The visit was conducted pursuant to the emergency declaration under the [facility], [address] [place] [address] [facility] authority and the [employer] Act. Patient identification was verified, and a caregiver was present when appropriate. The patient was located at home in a state where the provider was licensed to provide care.

## 2022-04-22 NOTE — PATIENT INSTRUCTIONS
Personalized Preventive Plan for Austyn Anderson - 4/22/2022  Medicare offers a range of preventive health benefits. Some of the tests and screenings are paid in full while other may be subject to a deductible, co-insurance, and/or copay. Some of these benefits include a comprehensive review of your medical history including lifestyle, illnesses that may run in your family, and various assessments and screenings as appropriate. After reviewing your medical record and screening and assessments performed today your provider may have ordered immunizations, labs, imaging, and/or referrals for you. A list of these orders (if applicable) as well as your Preventive Care list are included within your After Visit Summary for your review. Other Preventive Recommendations:    · A preventive eye exam performed by an eye specialist is recommended every 1-2 years to screen for glaucoma; cataracts, macular degeneration, and other eye disorders. · A preventive dental visit is recommended every 6 months. · Try to get at least 150 minutes of exercise per week or 10,000 steps per day on a pedometer . · Order or download the FREE \"Exercise & Physical Activity: Your Everyday Guide\" from [employer]. Call 6-431.507.5165 or search [employer] online. · You need 6842-2333 mg of calcium and 7835-9450 IU of vitamin D per day. It is possible to meet your calcium requirement with diet alone, but a vitamin D supplement is usually necessary to meet this goal.  · When exposed to the sun, use a sunscreen that protects against both UVA and UVB radiation with an SPF of 30 or greater. Reapply every 2 to 3 hours or after sweating, drying off with a towel, or swimming. · Always wear a seat belt when traveling in a car. Always wear a helmet when riding a bicycle or motorcycle.

## 2022-04-28 ENCOUNTER — TELEPHONE (OUTPATIENT)
Dept: FAMILY MEDICINE CLINIC | Age: 82
End: 2022-04-28

## 2022-04-28 NOTE — TELEPHONE ENCOUNTER
Patient walked into the office today stating he was called and told to walk in a get some labs done and then walk back to pod a for an appointment at 1:15. Patient did not have and appointment scheduled. I did try and call and I did also go back and check but the ma was not available to ask. I also did not see any messages regarding this in the chart. Please advise and then call the patient.

## 2022-06-09 RX ORDER — ALLOPURINOL 100 MG/1
100 TABLET ORAL DAILY
Qty: 90 TABLET | Refills: 0 | Status: SHIPPED | OUTPATIENT
Start: 2022-06-09 | End: 2022-08-10

## 2022-06-09 NOTE — TELEPHONE ENCOUNTER
Needs 90 day supply sent to [facility]    pt requesting medication refill.  Please approve or deny this request.    Rx requested:  Requested Prescriptions     Pending Prescriptions Disp Refills    allopurinol (ZYLOPRIM) 100 MG tablet 30 tablet 0     Sig: Take 1 tablet by mouth daily         Last Office Visit:   4/22/2022      Next Visit Date:  Future Appointments   Date Time Provider [place]an[place]ito Bustos   7/12/2022  2:00 PM Annetta Mcclendon  [place], [place] [address]

## 2022-07-12 ENCOUNTER — OFFICE VISIT (OUTPATIENT)
Dept: FAMILY MEDICINE CLINIC | Age: 82
End: 2022-07-12
Payer: MEDICARE

## 2022-07-12 ENCOUNTER — TELEPHONE (OUTPATIENT)
Dept: FAMILY MEDICINE CLINIC | Age: 82
End: 2022-07-12

## 2022-07-12 VITALS
OXYGEN SATURATION: 99 % | WEIGHT: 166 LBS | HEART RATE: 66 BPM | TEMPERATURE: 97.2 F | SYSTOLIC BLOOD PRESSURE: 161 MMHG | DIASTOLIC BLOOD PRESSURE: 81 MMHG | BODY MASS INDEX: 23.82 KG/M2

## 2022-07-12 DIAGNOSIS — N18.30 STAGE 3 CHRONIC KIDNEY DISEASE, UNSPECIFIED WHETHER STAGE 3A OR 3B CKD (HCC): Primary | ICD-10-CM

## 2022-07-12 DIAGNOSIS — Z87.39 HISTORY OF GOUT: ICD-10-CM

## 2022-07-12 DIAGNOSIS — I10 PRIMARY HYPERTENSION: ICD-10-CM

## 2022-07-12 PROCEDURE — G8427 DOCREV CUR MEDS BY ELIG CLIN: HCPCS | Performed by: INTERNAL MEDICINE

## 2022-07-12 PROCEDURE — G8420 CALC BMI NORM PARAMETERS: HCPCS | Performed by: INTERNAL MEDICINE

## 2022-07-12 PROCEDURE — 1036F TOBACCO NON-USER: CPT | Performed by: INTERNAL MEDICINE

## 2022-07-12 PROCEDURE — 1123F ACP DISCUSS/DSCN MKR DOCD: CPT | Performed by: INTERNAL MEDICINE

## 2022-07-12 PROCEDURE — 99214 OFFICE O/P EST MOD 30 MIN: CPT | Performed by: INTERNAL MEDICINE

## 2022-07-12 RX ORDER — COLCHICINE 0.6 MG/1
0.6 TABLET ORAL EVERY OTHER DAY
Qty: 30 TABLET | Refills: 0 | Status: SHIPPED | OUTPATIENT
Start: 2022-07-12

## 2022-07-12 ASSESSMENT — ENCOUNTER SYMPTOMS
BACK PAIN: 0
EYE PAIN: 0
SHORTNESS OF BREATH: 0
ABDOMINAL PAIN: 0
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
## 2022-07-13 NOTE — TELEPHONE ENCOUNTER
Spoke with patient around 730 pm documented now  Two bp from home of 148/87, 147/83  Patient will call me tomorrow if bp not coming down further.

## 2022-08-10 RX ORDER — ALLOPURINOL 100 MG/1
100 TABLET ORAL DAILY
Qty: 90 TABLET | Refills: 0 | Status: SHIPPED | OUTPATIENT
Start: 2022-08-10

## 2022-09-20 ENCOUNTER — NURSE ONLY (OUTPATIENT)
Dept: FAMILY MEDICINE CLINIC | Age: 82
End: 2022-09-20
Payer: MEDICARE

## 2022-09-20 DIAGNOSIS — Z87.39 HISTORY OF GOUT: ICD-10-CM

## 2022-09-20 DIAGNOSIS — Z23 NEED FOR INFLUENZA VACCINATION: Primary | ICD-10-CM

## 2022-09-20 DIAGNOSIS — I10 PRIMARY HYPERTENSION: ICD-10-CM

## 2022-09-20 DIAGNOSIS — N18.30 STAGE 3 CHRONIC KIDNEY DISEASE, UNSPECIFIED WHETHER STAGE 3A OR 3B CKD (HCC): ICD-10-CM

## 2022-09-20 LAB
ALBUMIN SERPL-MCNC: 4.4 G/DL (ref 3.5–4.6)
ALP BLD-CCNC: 59 U/L (ref 35–104)
ALT SERPL-CCNC: 8 U/L (ref 0–41)
ANION GAP SERPL CALCULATED.3IONS-SCNC: 12 MEQ/L (ref 9–15)
AST SERPL-CCNC: 20 U/L (ref 0–40)
BASOPHILS ABSOLUTE: 0.1 K/UL (ref 0–0.2)
BASOPHILS RELATIVE PERCENT: 1.2 %
BILIRUB SERPL-MCNC: 0.7 MG/DL (ref 0.2–0.7)
BUN BLDV-MCNC: 30 MG/DL (ref 8–23)
CALCIUM SERPL-MCNC: 9.5 MG/DL (ref 8.5–9.9)
CHLORIDE BLD-SCNC: 104 MEQ/L (ref 95–107)
CO2: 26 MEQ/L (ref 20–31)
CREAT SERPL-MCNC: 1.56 MG/DL (ref 0.7–1.2)
EOSINOPHILS ABSOLUTE: 0.3 K/UL (ref 0–0.7)
EOSINOPHILS RELATIVE PERCENT: 5.4 %
GFR AFRICAN AMERICAN: 51.5
GFR NON-AFRICAN AMERICAN: 42.5
GLOBULIN: 2.4 G/DL (ref 2.3–3.5)
GLUCOSE BLD-MCNC: 117 MG/DL (ref 70–99)
HCT VFR BLD CALC: 41.6 % (ref 42–52)
HEMOGLOBIN: 14 G/DL (ref 14–18)
LYMPHOCYTES ABSOLUTE: 1.1 K/UL (ref 1–4.8)
LYMPHOCYTES RELATIVE PERCENT: 24.1 %
MCH RBC QN AUTO: 31.1 PG (ref 27–31.3)
MCHC RBC AUTO-ENTMCNC: 33.8 % (ref 33–37)
MCV RBC AUTO: 92.1 FL (ref 80–100)
MONOCYTES ABSOLUTE: 0.6 K/UL (ref 0.2–0.8)
MONOCYTES RELATIVE PERCENT: 12.3 %
NEUTROPHILS ABSOLUTE: 2.7 K/UL (ref 1.4–6.5)
NEUTROPHILS RELATIVE PERCENT: 57 %
PDW BLD-RTO: 14.3 % (ref 11.5–14.5)
PLATELET # BLD: 186 K/UL (ref 130–400)
POTASSIUM SERPL-SCNC: 4.8 MEQ/L (ref 3.4–4.9)
RBC # BLD: 4.51 M/UL (ref 4.7–6.1)
SODIUM BLD-SCNC: 142 MEQ/L (ref 135–144)
TOTAL PROTEIN: 6.8 G/DL (ref 6.3–8)
TSH REFLEX: 0.82 UIU/ML (ref 0.44–3.86)
URIC ACID, SERUM: 6.6 MG/DL (ref 3.4–7)
WBC # BLD: 4.7 K/UL (ref 4.8–10.8)

## 2022-09-20 PROCEDURE — 90694 VACC AIIV4 NO PRSRV 0.5ML IM: CPT | Performed by: STUDENT IN AN ORGANIZED HEALTH CARE EDUCATION/TRAINING PROGRAM

## 2022-09-20 PROCEDURE — G0008 ADMIN INFLUENZA VIRUS VAC: HCPCS | Performed by: STUDENT IN AN ORGANIZED HEALTH CARE EDUCATION/TRAINING PROGRAM

## 2022-09-20 NOTE — PROGRESS NOTES
Vaccine Information Sheet, \"Influenza - Inactivated\"  given to Jessenia Denise, or parent/legal guardian of  Jessenia Denise and verbalized understanding. Patient responses:    Have you ever had a reaction to a flu vaccine? No  Are you able to eat eggs without adverse effects? Yes  Do you have any current illness? No  Have you ever had Guillian Albion Syndrome? No    Flu vaccine given per order. Please see immunization tab.

## 2022-11-10 DIAGNOSIS — I10 ESSENTIAL HYPERTENSION: ICD-10-CM

## 2022-11-10 RX ORDER — BENAZEPRIL HYDROCHLORIDE 40 MG/1
40 TABLET, FILM COATED ORAL DAILY
Qty: 90 TABLET | Refills: 3 | OUTPATIENT
Start: 2022-11-10

## 2022-11-10 RX ORDER — ALLOPURINOL 100 MG/1
100 TABLET ORAL DAILY
Qty: 90 TABLET | Refills: 0 | Status: SHIPPED | OUTPATIENT
Start: 2022-11-10

## 2022-11-10 RX ORDER — BENAZEPRIL HYDROCHLORIDE 40 MG/1
40 TABLET, FILM COATED ORAL DAILY
Qty: 90 TABLET | Refills: 0 | Status: SHIPPED | OUTPATIENT
Start: 2022-11-10

## 2022-11-10 NOTE — TELEPHONE ENCOUNTER
PATIENT EST CARE KELSEY 12/6 with DR. JACQUES    Rx requested:  Requested Prescriptions     Pending Prescriptions Disp Refills    benazepril (LOTENSIN) 40 MG tablet 90 tablet 3     Sig: Take 1 tablet by mouth daily    allopurinol (ZYLOPRIM) 100 MG tablet 90 tablet 0     Sig: Take 1 tablet by mouth daily         Last Office Visit:   7/12/2022      Next Visit Date:  Future Appointments   Date Time Provider Guanakito Bustos   12/6/2022 11:30 AM Librado Hurley DO [address] [place] [address] 7

## 2023-01-19 RX ORDER — ALLOPURINOL 100 MG/1
100 TABLET ORAL DAILY
Qty: 90 TABLET | Refills: 3 | Status: SHIPPED | OUTPATIENT
Start: 2023-01-19

## 2023-01-30 DIAGNOSIS — I10 ESSENTIAL HYPERTENSION: ICD-10-CM

## 2023-01-31 RX ORDER — BENAZEPRIL HYDROCHLORIDE 40 MG/1
40 TABLET, FILM COATED ORAL DAILY
Qty: 90 TABLET | Refills: 0 | Status: SHIPPED | OUTPATIENT
Start: 2023-01-31

## 2023-03-22 ENCOUNTER — OFFICE VISIT (OUTPATIENT)
Dept: FAMILY MEDICINE CLINIC | Age: 83
End: 2023-03-22

## 2023-03-22 VITALS
SYSTOLIC BLOOD PRESSURE: 140 MMHG | HEART RATE: 69 BPM | OXYGEN SATURATION: 98 % | HEIGHT: 70 IN | BODY MASS INDEX: 24.42 KG/M2 | WEIGHT: 170.6 LBS | DIASTOLIC BLOOD PRESSURE: 70 MMHG | TEMPERATURE: 97.7 F

## 2023-03-22 DIAGNOSIS — Z76.89 ENCOUNTER TO ESTABLISH CARE WITH NEW DOCTOR: Primary | ICD-10-CM

## 2023-03-22 DIAGNOSIS — C61 PROSTATE CANCER (HCC): ICD-10-CM

## 2023-03-22 DIAGNOSIS — R73.03 PREDIABETES: ICD-10-CM

## 2023-03-22 DIAGNOSIS — N18.31 CHRONIC RENAL FAILURE, STAGE 3A (HCC): ICD-10-CM

## 2023-03-22 DIAGNOSIS — I10 ESSENTIAL HYPERTENSION: ICD-10-CM

## 2023-03-22 RX ORDER — AMLODIPINE BESYLATE 5 MG/1
5 TABLET ORAL DAILY
Qty: 90 TABLET | Refills: 1 | Status: SHIPPED | OUTPATIENT
Start: 2023-03-22

## 2023-03-22 RX ORDER — BENAZEPRIL HYDROCHLORIDE 40 MG/1
40 TABLET, FILM COATED ORAL DAILY
Qty: 90 TABLET | Refills: 1 | Status: SHIPPED | OUTPATIENT
Start: 2023-03-22

## 2023-03-22 RX ORDER — AMLODIPINE BESYLATE 5 MG/1
5 TABLET ORAL DAILY
COMMUNITY
Start: 2023-03-14 | End: 2023-03-22 | Stop reason: SDUPTHER

## 2023-03-22 SDOH — ECONOMIC STABILITY: INCOME INSECURITY: HOW HARD IS IT FOR YOU TO PAY FOR THE VERY BASICS LIKE FOOD, HOUSING, MEDICAL CARE, AND HEATING?: NOT HARD AT ALL

## 2023-03-22 SDOH — ECONOMIC STABILITY: FOOD INSECURITY: WITHIN THE PAST 12 MONTHS, YOU WORRIED THAT YOUR FOOD WOULD RUN OUT BEFORE YOU GOT MONEY TO BUY MORE.: NEVER TRUE

## 2023-03-22 SDOH — ECONOMIC STABILITY: FOOD INSECURITY: WITHIN THE PAST 12 MONTHS, THE FOOD YOU BOUGHT JUST DIDN'T LAST AND YOU DIDN'T HAVE MONEY TO GET MORE.: NEVER TRUE

## 2023-03-22 SDOH — ECONOMIC STABILITY: HOUSING INSECURITY
IN THE LAST 12 MONTHS, WAS THERE A TIME WHEN YOU DID NOT HAVE A STEADY PLACE TO SLEEP OR SLEPT IN A SHELTER (INCLUDING NOW)?: NO

## 2023-03-22 ASSESSMENT — PATIENT HEALTH QUESTIONNAIRE - PHQ9
SUM OF ALL RESPONSES TO PHQ QUESTIONS 1-9: 0
1. LITTLE INTEREST OR PLEASURE IN DOING THINGS: 0
SUM OF ALL RESPONSES TO PHQ QUESTIONS 1-9: 0
SUM OF ALL RESPONSES TO PHQ QUESTIONS 1-9: 0
SUM OF ALL RESPONSES TO PHQ9 QUESTIONS 1 & 2: 0
SUM OF ALL RESPONSES TO PHQ QUESTIONS 1-9: 0
2. FEELING DOWN, DEPRESSED OR HOPELESS: 0

## 2023-03-22 ASSESSMENT — ENCOUNTER SYMPTOMS
COUGH: 0
SORE THROAT: 0
WHEEZING: 0
ABDOMINAL PAIN: 0
RHINORRHEA: 0
NAUSEA: 0
EYE DISCHARGE: 0
VOMITING: 0
SHORTNESS OF BREATH: 0
DIARRHEA: 0
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
## 2023-06-23 ENCOUNTER — OFFICE VISIT (OUTPATIENT)
Dept: FAMILY MEDICINE CLINIC | Age: 83
End: 2023-06-23

## 2023-06-23 VITALS
SYSTOLIC BLOOD PRESSURE: 147 MMHG | HEIGHT: 70 IN | BODY MASS INDEX: 24.77 KG/M2 | TEMPERATURE: 97.8 F | OXYGEN SATURATION: 99 % | DIASTOLIC BLOOD PRESSURE: 70 MMHG | HEART RATE: 68 BPM | WEIGHT: 173 LBS

## 2023-06-23 VITALS
HEART RATE: 68 BPM | BODY MASS INDEX: 24.77 KG/M2 | HEIGHT: 70 IN | WEIGHT: 173 LBS | SYSTOLIC BLOOD PRESSURE: 147 MMHG | OXYGEN SATURATION: 99 % | DIASTOLIC BLOOD PRESSURE: 70 MMHG | TEMPERATURE: 97.8 F

## 2023-06-23 DIAGNOSIS — I10 ESSENTIAL HYPERTENSION: Primary | ICD-10-CM

## 2023-06-23 DIAGNOSIS — R73.03 PREDIABETES: ICD-10-CM

## 2023-06-23 DIAGNOSIS — Z00.00 MEDICARE ANNUAL WELLNESS VISIT, SUBSEQUENT: Primary | ICD-10-CM

## 2023-06-23 DIAGNOSIS — M25.562 CHRONIC PAIN OF BOTH KNEES: ICD-10-CM

## 2023-06-23 DIAGNOSIS — M25.561 CHRONIC PAIN OF BOTH KNEES: ICD-10-CM

## 2023-06-23 DIAGNOSIS — N18.31 CHRONIC RENAL FAILURE, STAGE 3A (HCC): ICD-10-CM

## 2023-06-23 DIAGNOSIS — G89.29 CHRONIC PAIN OF BOTH KNEES: ICD-10-CM

## 2023-06-23 RX ORDER — BENAZEPRIL HYDROCHLORIDE 40 MG/1
40 TABLET, FILM COATED ORAL DAILY
Qty: 90 TABLET | Refills: 3 | Status: SHIPPED | OUTPATIENT
Start: 2023-06-23

## 2023-06-23 RX ORDER — AMLODIPINE BESYLATE 5 MG/1
5 TABLET ORAL DAILY
Qty: 90 TABLET | Refills: 3 | Status: SHIPPED | OUTPATIENT
Start: 2023-06-23

## 2023-06-23 ASSESSMENT — ENCOUNTER SYMPTOMS
DIARRHEA: 0
ABDOMINAL PAIN: 0
WHEEZING: 0
NAUSEA: 0
COUGH: 0
SORE THROAT: 0
RHINORRHEA: 0
VOMITING: 0
SHORTNESS OF BREATH: 0
EYE DISCHARGE: 0

## 2023-06-23 ASSESSMENT — PATIENT HEALTH QUESTIONNAIRE - PHQ9
1. LITTLE INTEREST OR PLEASURE IN DOING THINGS: 0
SUM OF ALL RESPONSES TO PHQ9 QUESTIONS 1 & 2: 1
SUM OF ALL RESPONSES TO PHQ QUESTIONS 1-9: 1
SUM OF ALL RESPONSES TO PHQ QUESTIONS 1-9: 1
2. FEELING DOWN, DEPRESSED OR HOPELESS: 1
SUM OF ALL RESPONSES TO PHQ QUESTIONS 1-9: 1
SUM OF ALL RESPONSES TO PHQ QUESTIONS 1-9: 1

## 2023-06-23 ASSESSMENT — LIFESTYLE VARIABLES
HOW OFTEN DURING THE LAST YEAR HAVE YOU FOUND THAT YOU WERE NOT ABLE TO STOP DRINKING ONCE YOU HAD STARTED: 0
HAS A RELATIVE, FRIEND, DOCTOR, OR ANOTHER HEALTH PROFESSIONAL EXPRESSED CONCERN ABOUT YOUR DRINKING OR SUGGESTED YOU CUT DOWN: 0
HAVE YOU OR SOMEONE ELSE BEEN INJURED AS A RESULT OF YOUR DRINKING: 0
HOW OFTEN DO YOU HAVE A DRINK CONTAINING ALCOHOL: 4 OR MORE TIMES A WEEK
HOW OFTEN DURING THE LAST YEAR HAVE YOU FAILED TO DO WHAT WAS NORMALLY EXPECTED FROM YOU BECAUSE OF DRINKING: 0
HOW OFTEN DURING THE LAST YEAR HAVE YOU HAD A FEELING OF GUILT OR REMORSE AFTER DRINKING: 0
HOW OFTEN DURING THE LAST YEAR HAVE YOU NEEDED AN ALCOHOLIC DRINK FIRST THING IN THE MORNING TO GET YOURSELF GOING AFTER A NIGHT OF HEAVY DRINKING: 0
HOW MANY STANDARD DRINKS CONTAINING ALCOHOL DO YOU HAVE ON A TYPICAL DAY: 1 OR 2
HOW OFTEN DURING THE LAST YEAR HAVE YOU BEEN UNABLE TO REMEMBER WHAT HAPPENED THE NIGHT BEFORE BECAUSE YOU HAD BEEN DRINKING: 0

## 2023-06-23 NOTE — PROGRESS NOTES
Subjective  Jay Nguyễn, 78 y.o. male presents today with:  Chief Complaint   Patient presents with    3 Month Follow-Up    Hypertension     Does not check BP at home. Denies any chest pains, heart palpitations, headaches, dizziness or SOB. Diabetes     Last A1C was 6.0 on 3/6/23. Chronic Kidney Disease     Follows with Dr. Candance Arts. Knee Pain     States he has chronic knee pain, has been getting worse. States if he stands for a period of time he finds it hard to get moving again. Has not noticed any swelling. Does not use heat or ice. Takes OTC Tylenol Arthritis when playing golf twice a week. Patient with 3-month follow-up appointment. Patient with essential hypertension. Blood pressure is very mildly elevated today. He is currently on amlodipine 5 mg and benazepril 40 mg. No side effects from medication. No chest pain, shortness of breath, palpitations, headaches or dizziness. Patient also with prediabetes. His last A1c was 6% in March. Not currently on meds. Patient also with chronic kidney disease. He does follow with nephrology. Recent labs are stable. Patient also with new concern for chronic bilateral knee pain. He states it has been getting worse. He has not had any imaging done of the knees. He has not noticed any swelling. He states that it is hard to get moving with his knees. He takes over-the-counter Tylenol arthritis when playing golf twice a week. He states that this does help a little bit. He is not interested in surgery. He has no other concerns at this time. Review of Systems   Constitutional:  Negative for chills, fatigue, fever and unexpected weight change. HENT:  Negative for congestion, rhinorrhea and sore throat. Eyes:  Negative for discharge. Respiratory:  Negative for cough, shortness of breath and wheezing. Cardiovascular:  Negative for chest pain, palpitations and leg swelling.    Gastrointestinal:  Negative for abdominal

## 2023-06-23 NOTE — PATIENT INSTRUCTIONS
and enter F075 to learn more about \"A Healthy Heart: Care Instructions. \"  Current as of: September 7, 2022               Content Version: 13.7  © 2006-2023 Healthwise, [employer]. Care instructions adapted under license by [facility] ([facility]). If you have questions about a medical condition or this instruction, always ask your healthcare professional. [address] any warranty or liability for your use of this information. Personalized Preventive Plan for Mariana Dominguez - 6/23/2023  Medicare offers a range of preventive health benefits. Some of the tests and screenings are paid in full while other may be subject to a deductible, co-insurance, and/or copay. Some of these benefits include a comprehensive review of your medical history including lifestyle, illnesses that may run in your family, and various assessments and screenings as appropriate. After reviewing your medical record and screening and assessments performed today your provider may have ordered immunizations, labs, imaging, and/or referrals for you. A list of these orders (if applicable) as well as your Preventive Care list are included within your After Visit Summary for your review. Other Preventive Recommendations:    A preventive eye exam performed by an eye specialist is recommended every 1-2 years to screen for glaucoma; cataracts, macular degeneration, and other eye disorders. A preventive dental visit is recommended every 6 months. Try to get at least 150 minutes of exercise per week or 10,000 steps per day on a pedometer . Order or download the FREE \"Exercise & Physical Activity: Your Everyday Guide\" from [employer]. Call 4-856.473.4237 or search [employer] online. You need 2368-1557 mg of calcium and 9171-3976 IU of vitamin D per day.  It is possible to meet your calcium requirement with diet alone, but a vitamin D supplement is usually necessary to meet this

## 2023-06-23 NOTE — PROGRESS NOTES
Medicare Annual Wellness Visit    Jennifer Frey is here for Medicare AWV    Assessment & Plan   Medicare annual wellness visit, subsequent  Recommendations for Preventive Services Due: see orders and patient instructions/AVS.  Recommended screening schedule for the next 5-10 years is provided to the patient in written form: see Patient Instructions/AVS.     No follow-ups on file. Subjective       Patient's complete Health Risk Assessment and screening values have been reviewed and are found in Flowsheets. The following problems were reviewed today and where indicated follow up appointments were made and/or referrals ordered. Positive Risk Factor Screenings with Interventions:               General HRA Questions:  Select all that apply: (!) Stress    Stress Interventions:  Patient declined any further interventions or treatment         Hearing Screen:  Do you or your family notice any trouble with your hearing that hasn't been managed with hearing aids?: (!) Yes    Interventions:  Patient declines any further evaluation or treatment    Vision Screen:  Do you have difficulty driving, watching TV, or doing any of your daily activities because of your eyesight?: (!) Yes  Have you had an eye exam within the past year?: Yes  No results found. Interventions:   Patient encouraged to make appointment with their eye specialist      Advanced Directives:  Do you have a Living Will?: (!) No    Intervention:  has NO advanced directive - not interested in additional information                       Objective   Vitals:    06/23/23 1352 06/23/23 1410   BP: (!) 143/66 (!) 147/70   Pulse: 68    Temp: 97.8 °F (36.6 °C)    SpO2: 99%    Weight: 173 lb (78.5 kg)    Height: 5' 10\" (1.778 m)       Body mass index is 24.82 kg/m². Allergies   Allergen Reactions    Altace [Ramipril]     Ramipril Other (See Comments)    Uloric [Febuxostat] Other (See Comments)     Prior to Visit Medications    Medication Sig Taking?

## 2023-07-12 ENCOUNTER — PROCEDURE VISIT (OUTPATIENT)
Dept: FAMILY MEDICINE CLINIC | Age: 83
End: 2023-07-12
Payer: MEDICARE

## 2023-07-12 VITALS
SYSTOLIC BLOOD PRESSURE: 150 MMHG | OXYGEN SATURATION: 98 % | BODY MASS INDEX: 24.2 KG/M2 | TEMPERATURE: 97.6 F | HEIGHT: 70 IN | WEIGHT: 169 LBS | DIASTOLIC BLOOD PRESSURE: 70 MMHG | HEART RATE: 61 BPM

## 2023-07-12 DIAGNOSIS — M25.562 CHRONIC PAIN OF BOTH KNEES: ICD-10-CM

## 2023-07-12 DIAGNOSIS — G89.29 CHRONIC PAIN OF BOTH KNEES: ICD-10-CM

## 2023-07-12 DIAGNOSIS — M25.561 CHRONIC PAIN OF BOTH KNEES: ICD-10-CM

## 2023-07-12 DIAGNOSIS — M17.0 BILATERAL PRIMARY OSTEOARTHRITIS OF KNEE: Primary | ICD-10-CM

## 2023-07-12 PROCEDURE — 99999 PR OFFICE/OUTPT VISIT,PROCEDURE ONLY: CPT | Performed by: STUDENT IN AN ORGANIZED HEALTH CARE EDUCATION/TRAINING PROGRAM

## 2023-07-12 PROCEDURE — 20610 DRAIN/INJ JOINT/BURSA W/O US: CPT | Performed by: STUDENT IN AN ORGANIZED HEALTH CARE EDUCATION/TRAINING PROGRAM

## 2023-07-12 RX ORDER — TRIAMCINOLONE ACETONIDE 40 MG/ML
40 INJECTION, SUSPENSION INTRA-ARTICULAR; INTRAMUSCULAR ONCE
Status: COMPLETED | OUTPATIENT
Start: 2023-07-12 | End: 2023-07-12

## 2023-07-12 RX ADMIN — TRIAMCINOLONE ACETONIDE 40 MG: 40 INJECTION, SUSPENSION INTRA-ARTICULAR; INTRAMUSCULAR at 13:59

## 2023-07-12 RX ADMIN — TRIAMCINOLONE ACETONIDE 40 MG: 40 INJECTION, SUSPENSION INTRA-ARTICULAR; INTRAMUSCULAR at 13:58

## 2023-07-12 NOTE — PROGRESS NOTES
Joint Injection Procedure Note    Indication: bilateral knee osteoarthritis  After having explained the potential risks and benefits of the bilateral knee injection, the patient gave verbal consent to proceed. The patient's identifiers were confirmed with a timeout, including name and date of birth (and that no heat source or antibiotics were utilized with this procedure). The site was then confirmed and marked, and the area was prepped in aseptic fashion with Betadine. A 25gauge, 1.5 inch needle was directed into the above areas. The injection was completed with 40 mg of Kenalog mixed with 3 cc of 1% Lidocaine. The patient tolerated the procedure without difficulty and was instructed on post-injection care.      Ramírez Reyes DO

## 2023-11-24 RX ORDER — ALLOPURINOL 100 MG/1
100 TABLET ORAL DAILY
Qty: 90 TABLET | Refills: 0 | Status: SHIPPED | OUTPATIENT
Start: 2023-11-24 | End: 2024-01-25

## 2023-12-11 ENCOUNTER — OFFICE VISIT (OUTPATIENT)
Dept: FAMILY MEDICINE CLINIC | Age: 83
End: 2023-12-11
Payer: MEDICARE

## 2023-12-11 VITALS
DIASTOLIC BLOOD PRESSURE: 60 MMHG | SYSTOLIC BLOOD PRESSURE: 138 MMHG | HEIGHT: 70 IN | OXYGEN SATURATION: 96 % | BODY MASS INDEX: 24.51 KG/M2 | WEIGHT: 171.2 LBS | TEMPERATURE: 98 F | HEART RATE: 75 BPM

## 2023-12-11 DIAGNOSIS — R73.03 PREDIABETES: ICD-10-CM

## 2023-12-11 DIAGNOSIS — Z12.5 PROSTATE CANCER SCREENING: ICD-10-CM

## 2023-12-11 DIAGNOSIS — I10 ESSENTIAL HYPERTENSION: Primary | ICD-10-CM

## 2023-12-11 DIAGNOSIS — M10.9 GOUT, UNSPECIFIED CAUSE, UNSPECIFIED CHRONICITY, UNSPECIFIED SITE: ICD-10-CM

## 2023-12-11 DIAGNOSIS — M19.90 ARTHRITIS: ICD-10-CM

## 2023-12-11 DIAGNOSIS — N18.31 CHRONIC RENAL FAILURE, STAGE 3A (HCC): ICD-10-CM

## 2023-12-11 LAB
HBA1C MFR BLD: 5.9 % (ref 4.8–5.9)
PSA SERPL-MCNC: 2.89 NG/ML (ref 0–4)

## 2023-12-11 PROCEDURE — 99214 OFFICE O/P EST MOD 30 MIN: CPT | Performed by: STUDENT IN AN ORGANIZED HEALTH CARE EDUCATION/TRAINING PROGRAM

## 2023-12-11 PROCEDURE — 1036F TOBACCO NON-USER: CPT | Performed by: STUDENT IN AN ORGANIZED HEALTH CARE EDUCATION/TRAINING PROGRAM

## 2023-12-11 PROCEDURE — G8427 DOCREV CUR MEDS BY ELIG CLIN: HCPCS | Performed by: STUDENT IN AN ORGANIZED HEALTH CARE EDUCATION/TRAINING PROGRAM

## 2023-12-11 PROCEDURE — 3078F DIAST BP <80 MM HG: CPT | Performed by: STUDENT IN AN ORGANIZED HEALTH CARE EDUCATION/TRAINING PROGRAM

## 2023-12-11 PROCEDURE — 3075F SYST BP GE 130 - 139MM HG: CPT | Performed by: STUDENT IN AN ORGANIZED HEALTH CARE EDUCATION/TRAINING PROGRAM

## 2023-12-11 PROCEDURE — G8420 CALC BMI NORM PARAMETERS: HCPCS | Performed by: STUDENT IN AN ORGANIZED HEALTH CARE EDUCATION/TRAINING PROGRAM

## 2023-12-11 PROCEDURE — G8484 FLU IMMUNIZE NO ADMIN: HCPCS | Performed by: STUDENT IN AN ORGANIZED HEALTH CARE EDUCATION/TRAINING PROGRAM

## 2023-12-11 PROCEDURE — 1123F ACP DISCUSS/DSCN MKR DOCD: CPT | Performed by: STUDENT IN AN ORGANIZED HEALTH CARE EDUCATION/TRAINING PROGRAM

## 2023-12-11 ASSESSMENT — ENCOUNTER SYMPTOMS
DIARRHEA: 0
SHORTNESS OF BREATH: 0
COUGH: 0
SORE THROAT: 0
ABDOMINAL PAIN: 0
EYE DISCHARGE: 0
NAUSEA: 0
VOMITING: 0
RHINORRHEA: 0
WHEEZING: 0

## 2023-12-11 NOTE — PROGRESS NOTES
Subjective  Roslyn Hands, 78 y.o. male presents today with:  Chief Complaint   Patient presents with    6 Month Follow-Up    Hypertension     Checks BP on occasion. Denies any chest pains, heart palpitations, headaches, dizziness, SOB or edema. Pre Diabetes     Last A1c was 6.0 on 3/6/23. Chronic Kidney Disease     Follows with Dr. Jacob Jung. Knee Pain     Is managing at this time. With 6-month follow-up appointment. Patient with essential hypertension. Normotensive in the office today. He does follow with nephrology for this as well as chronic kidney disease. He does occasionally check blood pressure at home. He denies any chest pain, shortness of breath, palpitations, headaches or dizziness. Currently on amlodipine 5 mg daily and benazepril 40 mg daily. Tolerating well. No side effects from medication. Patient with chronic renal failure. This is stable and chronic. Following with nephrology. Patient also with gout. He is on allopurinol 100 mg daily. Doing well. No recent episodes of gout. Has not needed to use colchicine. He does have history of bilateral knee arthritis. Did well with steroid injections a couple months ago. He states pain has come back a little bit, however, he does not feel like he needs injections at this time. Will plan to get them again before golf season. Patient also with prediabetes. Not currently on meds. Last A1c was 6% in March. Will get today. He also would like to get prostate cancer screening with PSA. Lab ordered. No other concerns at this time. Review of Systems   Constitutional:  Negative for chills, fatigue, fever and unexpected weight change. HENT:  Negative for congestion, rhinorrhea and sore throat. Eyes:  Negative for discharge. Respiratory:  Negative for cough, shortness of breath and wheezing. Cardiovascular:  Negative for chest pain, palpitations and leg swelling.    Gastrointestinal:  Negative for abdominal

## 2024-01-25 RX ORDER — ALLOPURINOL 100 MG/1
100 TABLET ORAL DAILY
Qty: 90 TABLET | Refills: 3 | Status: SHIPPED | OUTPATIENT
Start: 2024-01-25

## 2024-03-27 ENCOUNTER — PROCEDURE VISIT (OUTPATIENT)
Dept: FAMILY MEDICINE CLINIC | Age: 84
End: 2024-03-27

## 2024-03-27 VITALS
TEMPERATURE: 97.5 F | HEIGHT: 70 IN | WEIGHT: 176 LBS | OXYGEN SATURATION: 99 % | DIASTOLIC BLOOD PRESSURE: 58 MMHG | BODY MASS INDEX: 25.2 KG/M2 | HEART RATE: 69 BPM | SYSTOLIC BLOOD PRESSURE: 128 MMHG

## 2024-03-27 DIAGNOSIS — M17.0 BILATERAL PRIMARY OSTEOARTHRITIS OF KNEE: Primary | ICD-10-CM

## 2024-03-27 RX ORDER — TRIAMCINOLONE ACETONIDE 40 MG/ML
40 INJECTION, SUSPENSION INTRA-ARTICULAR; INTRAMUSCULAR ONCE
Status: COMPLETED | OUTPATIENT
Start: 2024-03-27 | End: 2024-03-27

## 2024-03-27 RX ADMIN — TRIAMCINOLONE ACETONIDE 40 MG: 40 INJECTION, SUSPENSION INTRA-ARTICULAR; INTRAMUSCULAR at 10:00

## 2024-03-27 RX ADMIN — TRIAMCINOLONE ACETONIDE 40 MG: 40 INJECTION, SUSPENSION INTRA-ARTICULAR; INTRAMUSCULAR at 09:57

## 2024-03-27 SDOH — ECONOMIC STABILITY: FOOD INSECURITY: WITHIN THE PAST 12 MONTHS, YOU WORRIED THAT YOUR FOOD WOULD RUN OUT BEFORE YOU GOT MONEY TO BUY MORE.: NEVER TRUE

## 2024-03-27 SDOH — ECONOMIC STABILITY: FOOD INSECURITY: WITHIN THE PAST 12 MONTHS, THE FOOD YOU BOUGHT JUST DIDN'T LAST AND YOU DIDN'T HAVE MONEY TO GET MORE.: NEVER TRUE

## 2024-03-27 SDOH — ECONOMIC STABILITY: INCOME INSECURITY: HOW HARD IS IT FOR YOU TO PAY FOR THE VERY BASICS LIKE FOOD, HOUSING, MEDICAL CARE, AND HEATING?: NOT HARD AT ALL

## 2024-03-27 ASSESSMENT — PATIENT HEALTH QUESTIONNAIRE - PHQ9
SUM OF ALL RESPONSES TO PHQ9 QUESTIONS 1 & 2: 0
1. LITTLE INTEREST OR PLEASURE IN DOING THINGS: NOT AT ALL
SUM OF ALL RESPONSES TO PHQ QUESTIONS 1-9: 0
2. FEELING DOWN, DEPRESSED OR HOPELESS: NOT AT ALL

## 2024-03-27 NOTE — PROGRESS NOTES
Joint Injection Procedure Note  Chief Complaint   Patient presents with    Procedure     Here for bilateral knee injections. Consent signed. Has no new concerns.     Hypertension     Does not check BP at home. Denies chest pains, heart palpitations, headaches, dizziness, SOB or edema.        Indication: bilateral primary osteoarthritis of knee   After having explained the potential risks and benefits of the bilateral knee injections, the patient gave verbal consent to proceed. The patient's identifiers were confirmed with a timeout, including name and date of birth (and that no heat source or antibiotics were utilized with this procedure). The site was then confirmed and marked, and the area was prepped in aseptic fashion with Betadine. A 25 gauge, 1.5inch needle was directed into the above area. The injection was completed with 40 mg of Kenalog mixed with 3 cc of 1% Lidocaine. The patient tolerated the procedure without difficulty and was instructed on post-injection care.

## 2024-06-12 ENCOUNTER — HOSPITAL ENCOUNTER (EMERGENCY)
Age: 84
Discharge: HOME | End: 2024-06-12
Attending: STUDENT IN AN ORGANIZED HEALTH CARE EDUCATION/TRAINING PROGRAM
Payer: MEDICARE

## 2024-06-12 ENCOUNTER — APPOINTMENT (OUTPATIENT)
Dept: GENERAL RADIOLOGY | Age: 84
End: 2024-06-12
Payer: MEDICARE

## 2024-06-12 VITALS
SYSTOLIC BLOOD PRESSURE: 122 MMHG | OXYGEN SATURATION: 97 % | DIASTOLIC BLOOD PRESSURE: 60 MMHG | HEART RATE: 74 BPM | WEIGHT: 175 LBS | HEIGHT: 70 IN | TEMPERATURE: 98 F | BODY MASS INDEX: 25.05 KG/M2 | RESPIRATION RATE: 20 BRPM

## 2024-06-12 DIAGNOSIS — E86.0 DEHYDRATION: ICD-10-CM

## 2024-06-12 DIAGNOSIS — I95.9 HYPOTENSION, UNSPECIFIED HYPOTENSION TYPE: Primary | ICD-10-CM

## 2024-06-12 LAB
ALBUMIN SERPL-MCNC: 3.4 G/DL (ref 3.5–4.6)
ALP SERPL-CCNC: 48 U/L (ref 35–104)
ALT SERPL-CCNC: 12 U/L (ref 0–41)
ANION GAP SERPL CALCULATED.3IONS-SCNC: 12 MEQ/L (ref 9–15)
APTT PPP: 22.6 SEC (ref 24.4–36.8)
AST SERPL-CCNC: 16 U/L (ref 0–40)
BASOPHILS # BLD: 0.1 K/UL (ref 0–0.2)
BASOPHILS NFR BLD: 0.7 %
BILIRUB SERPL-MCNC: 0.4 MG/DL (ref 0.2–0.7)
BILIRUB UR QL STRIP: NEGATIVE
BUN SERPL-MCNC: 28 MG/DL (ref 8–23)
CALCIUM SERPL-MCNC: 8.2 MG/DL (ref 8.5–9.9)
CHLORIDE SERPL-SCNC: 110 MEQ/L (ref 95–107)
CLARITY UR: CLEAR
CO2 SERPL-SCNC: 20 MEQ/L (ref 20–31)
COLOR UR: YELLOW
CREAT SERPL-MCNC: 1.47 MG/DL (ref 0.7–1.2)
EOSINOPHIL # BLD: 0.3 K/UL (ref 0–0.7)
EOSINOPHIL NFR BLD: 4.1 %
ERYTHROCYTE [DISTWIDTH] IN BLOOD BY AUTOMATED COUNT: 13 % (ref 11.5–14.5)
ETHANOL PERCENT: NORMAL G/DL
ETHANOLAMINE SERPL-MCNC: <10 MG/DL (ref 0–0.08)
GLOBULIN SER CALC-MCNC: 2.2 G/DL (ref 2.3–3.5)
GLUCOSE SERPL-MCNC: 111 MG/DL (ref 70–99)
GLUCOSE UR STRIP-MCNC: NEGATIVE MG/DL
HCT VFR BLD AUTO: 37.6 % (ref 42–52)
HGB BLD-MCNC: 11.9 G/DL (ref 14–18)
HGB UR QL STRIP: NEGATIVE
INR PPP: 1
KETONES UR STRIP-MCNC: ABNORMAL MG/DL
LACTATE BLDV-SCNC: 1.3 MMOL/L (ref 0.5–2.2)
LEUKOCYTE ESTERASE UR QL STRIP: NEGATIVE
LYMPHOCYTES # BLD: 0.8 K/UL (ref 1–4.8)
LYMPHOCYTES NFR BLD: 11 %
MAGNESIUM SERPL-MCNC: 2 MG/DL (ref 1.7–2.4)
MCH RBC QN AUTO: 30.7 PG (ref 27–31.3)
MCHC RBC AUTO-ENTMCNC: 31.6 % (ref 33–37)
MCV RBC AUTO: 97.2 FL (ref 79–92.2)
MONOCYTES # BLD: 0.8 K/UL (ref 0.2–0.8)
MONOCYTES NFR BLD: 11 %
NEUTROPHILS # BLD: 5.6 K/UL (ref 1.4–6.5)
NEUTS SEG NFR BLD: 72.8 %
NITRITE UR QL STRIP: NEGATIVE
PH UR STRIP: 5.5 [PH] (ref 5–9)
PLATELET # BLD AUTO: 170 K/UL (ref 130–400)
POTASSIUM SERPL-SCNC: 4.4 MEQ/L (ref 3.4–4.9)
PROT SERPL-MCNC: 5.6 G/DL (ref 6.3–8)
PROT UR STRIP-MCNC: ABNORMAL MG/DL
PROTHROMBIN TIME: 13.5 SEC (ref 12.3–14.9)
RBC # BLD AUTO: 3.87 M/UL (ref 4.7–6.1)
SODIUM SERPL-SCNC: 142 MEQ/L (ref 135–144)
SP GR UR STRIP: 1.02 (ref 1–1.03)
URINE REFLEX TO CULTURE: ABNORMAL
UROBILINOGEN UR STRIP-ACNC: 0.2 E.U./DL
WBC # BLD AUTO: 7.6 K/UL (ref 4.8–10.8)

## 2024-06-12 PROCEDURE — 85025 COMPLETE CBC W/AUTO DIFF WBC: CPT

## 2024-06-12 PROCEDURE — 99285 EMERGENCY DEPT VISIT HI MDM: CPT

## 2024-06-12 PROCEDURE — 85610 PROTHROMBIN TIME: CPT

## 2024-06-12 PROCEDURE — 85730 THROMBOPLASTIN TIME PARTIAL: CPT

## 2024-06-12 PROCEDURE — 80053 COMPREHEN METABOLIC PANEL: CPT

## 2024-06-12 PROCEDURE — 82077 ASSAY SPEC XCP UR&BREATH IA: CPT

## 2024-06-12 PROCEDURE — 83735 ASSAY OF MAGNESIUM: CPT

## 2024-06-12 PROCEDURE — 36415 COLL VENOUS BLD VENIPUNCTURE: CPT

## 2024-06-12 PROCEDURE — 2580000003 HC RX 258: Performed by: EMERGENCY MEDICINE

## 2024-06-12 PROCEDURE — 96360 HYDRATION IV INFUSION INIT: CPT

## 2024-06-12 PROCEDURE — 93005 ELECTROCARDIOGRAM TRACING: CPT | Performed by: EMERGENCY MEDICINE

## 2024-06-12 PROCEDURE — 83605 ASSAY OF LACTIC ACID: CPT

## 2024-06-12 PROCEDURE — 81003 URINALYSIS AUTO W/O SCOPE: CPT

## 2024-06-12 PROCEDURE — 71045 X-RAY EXAM CHEST 1 VIEW: CPT

## 2024-06-12 RX ORDER — 0.9 % SODIUM CHLORIDE 0.9 %
500 INTRAVENOUS SOLUTION INTRAVENOUS ONCE
Status: COMPLETED | OUTPATIENT
Start: 2024-06-12 | End: 2024-06-12

## 2024-06-12 RX ADMIN — SODIUM CHLORIDE 500 ML: 9 INJECTION, SOLUTION INTRAVENOUS at 14:23

## 2024-06-12 ASSESSMENT — PAIN - FUNCTIONAL ASSESSMENT
PAIN_FUNCTIONAL_ASSESSMENT: NONE - DENIES PAIN

## 2024-06-12 ASSESSMENT — LIFESTYLE VARIABLES
HOW MANY STANDARD DRINKS CONTAINING ALCOHOL DO YOU HAVE ON A TYPICAL DAY: PATIENT DOES NOT DRINK
HOW OFTEN DO YOU HAVE A DRINK CONTAINING ALCOHOL: NEVER

## 2024-06-12 NOTE — ED TRIAGE NOTES
Per report pt was outside cutting grass when felt weak, 0 falls, 0 sob, 0 syncope, 0 dizziness reported, per squad initial bp was 70/40, 1l ns iv given by squad and bp up to 112/60, pt a&ox4 on arrival, denies nay pain, 0 dizziness, 0 sob, calm, cooperative.
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
## 2024-06-12 NOTE — ED NOTES
Pt a&ox4, skin w/d/pink, 0 pain, 0 distress, 0 c/o, pt out from ed with steady gait, going home with wife, 0 problems.

## 2024-06-12 NOTE — DISCHARGE INSTRUCTIONS
Follow-up with primary care physician.  Avoid exposure including sunlight and heat drink fluids monitor blood pressure if blood pressure less than 100 mm systolic hold Lotensin.  Return to if any symptoms worsen or new symptoms  develop.

## 2024-06-13 LAB
EKG ATRIAL RATE: 64 BPM
EKG P AXIS: 77 DEGREES
EKG P-R INTERVAL: 244 MS
EKG Q-T INTERVAL: 416 MS
EKG QRS DURATION: 94 MS
EKG QTC CALCULATION (BAZETT): 429 MS
EKG R AXIS: 0 DEGREES
EKG T AXIS: 61 DEGREES
EKG VENTRICULAR RATE: 64 BPM

## 2024-06-13 PROCEDURE — 93010 ELECTROCARDIOGRAM REPORT: CPT | Performed by: INTERNAL MEDICINE

## 2024-06-23 DIAGNOSIS — I10 ESSENTIAL HYPERTENSION: ICD-10-CM

## 2024-06-24 ENCOUNTER — OFFICE VISIT (OUTPATIENT)
Dept: FAMILY MEDICINE CLINIC | Age: 84
End: 2024-06-24
Payer: MEDICARE

## 2024-06-24 VITALS
OXYGEN SATURATION: 100 % | TEMPERATURE: 97.6 F | WEIGHT: 168 LBS | HEART RATE: 79 BPM | DIASTOLIC BLOOD PRESSURE: 70 MMHG | BODY MASS INDEX: 24.05 KG/M2 | HEIGHT: 70 IN | SYSTOLIC BLOOD PRESSURE: 136 MMHG

## 2024-06-24 VITALS
BODY MASS INDEX: 24.05 KG/M2 | HEIGHT: 70 IN | DIASTOLIC BLOOD PRESSURE: 70 MMHG | HEART RATE: 79 BPM | TEMPERATURE: 97.6 F | SYSTOLIC BLOOD PRESSURE: 136 MMHG | WEIGHT: 168 LBS | OXYGEN SATURATION: 100 %

## 2024-06-24 DIAGNOSIS — C61 PROSTATE CANCER (HCC): ICD-10-CM

## 2024-06-24 DIAGNOSIS — M17.0 BILATERAL PRIMARY OSTEOARTHRITIS OF KNEE: ICD-10-CM

## 2024-06-24 DIAGNOSIS — N18.31 CHRONIC RENAL FAILURE, STAGE 3A (HCC): ICD-10-CM

## 2024-06-24 DIAGNOSIS — M25.522 LEFT ELBOW PAIN: ICD-10-CM

## 2024-06-24 DIAGNOSIS — R73.03 PREDIABETES: ICD-10-CM

## 2024-06-24 DIAGNOSIS — M10.9 GOUT, UNSPECIFIED CAUSE, UNSPECIFIED CHRONICITY, UNSPECIFIED SITE: ICD-10-CM

## 2024-06-24 DIAGNOSIS — Z00.00 MEDICARE ANNUAL WELLNESS VISIT, SUBSEQUENT: Primary | ICD-10-CM

## 2024-06-24 DIAGNOSIS — I10 ESSENTIAL HYPERTENSION: Primary | ICD-10-CM

## 2024-06-24 DIAGNOSIS — H91.93 DECREASED HEARING OF BOTH EARS: ICD-10-CM

## 2024-06-24 PROCEDURE — G0439 PPPS, SUBSEQ VISIT: HCPCS | Performed by: STUDENT IN AN ORGANIZED HEALTH CARE EDUCATION/TRAINING PROGRAM

## 2024-06-24 PROCEDURE — 1036F TOBACCO NON-USER: CPT | Performed by: STUDENT IN AN ORGANIZED HEALTH CARE EDUCATION/TRAINING PROGRAM

## 2024-06-24 PROCEDURE — G8420 CALC BMI NORM PARAMETERS: HCPCS | Performed by: STUDENT IN AN ORGANIZED HEALTH CARE EDUCATION/TRAINING PROGRAM

## 2024-06-24 PROCEDURE — 99214 OFFICE O/P EST MOD 30 MIN: CPT | Performed by: STUDENT IN AN ORGANIZED HEALTH CARE EDUCATION/TRAINING PROGRAM

## 2024-06-24 PROCEDURE — G8427 DOCREV CUR MEDS BY ELIG CLIN: HCPCS | Performed by: STUDENT IN AN ORGANIZED HEALTH CARE EDUCATION/TRAINING PROGRAM

## 2024-06-24 PROCEDURE — 1123F ACP DISCUSS/DSCN MKR DOCD: CPT | Performed by: STUDENT IN AN ORGANIZED HEALTH CARE EDUCATION/TRAINING PROGRAM

## 2024-06-24 RX ORDER — BENAZEPRIL HYDROCHLORIDE 40 MG/1
40 TABLET ORAL DAILY
Qty: 90 TABLET | Refills: 3 | Status: SHIPPED | OUTPATIENT
Start: 2024-06-24

## 2024-06-24 RX ORDER — AMLODIPINE BESYLATE 5 MG/1
5 TABLET ORAL DAILY
Qty: 90 TABLET | Refills: 3 | Status: SHIPPED | OUTPATIENT
Start: 2024-06-24

## 2024-06-24 ASSESSMENT — LIFESTYLE VARIABLES
HOW MANY STANDARD DRINKS CONTAINING ALCOHOL DO YOU HAVE ON A TYPICAL DAY: 1 OR 2
HOW OFTEN DO YOU HAVE A DRINK CONTAINING ALCOHOL: MONTHLY OR LESS

## 2024-06-24 ASSESSMENT — PATIENT HEALTH QUESTIONNAIRE - PHQ9
SUM OF ALL RESPONSES TO PHQ QUESTIONS 1-9: 0
SUM OF ALL RESPONSES TO PHQ9 QUESTIONS 1 & 2: 0
2. FEELING DOWN, DEPRESSED OR HOPELESS: NOT AT ALL
SUM OF ALL RESPONSES TO PHQ QUESTIONS 1-9: 0
1. LITTLE INTEREST OR PLEASURE IN DOING THINGS: NOT AT ALL
SUM OF ALL RESPONSES TO PHQ QUESTIONS 1-9: 0
SUM OF ALL RESPONSES TO PHQ QUESTIONS 1-9: 0

## 2024-06-24 NOTE — PROGRESS NOTES
Subjective  Cornel Stokes, 84 y.o. male presents today with:  Chief Complaint   Patient presents with    6 Month Follow-Up    Hypertension     Occasionally checks BP at home. Denies chest pains, heart palpitations, headaches, dizziness, SOB or edema. Was seen in the ER 6/12/24 with hypotension.     Chronic Kidney Disease    Gout     Taking medication as directed. No side effects. Has not had any recent gout flares.     Pre-Diabetes     A1c was 5.9 on 12/11/24.     Knee Pain     Continues to have bilateral knee pain. States he didn't feel like the injections he had last time really helped.     Elbow Pain     Has been having left elbow pain for the past couple of weeks. Feels it is improving, but pain is still present with certain movements. Does have a little swelling in the elbow. Denies any injury. Has used ice to area. Has not taken any OTC pain relievers.        Patient with 6-month follow-up appointment.    Patient with essentially pretension.  Normotensive in the office today.  He is currently on amlodipine 5 mg and aspirin 40 mg.  He was recently seen in the ER for dehydration and hypotension.  This was about 2 weeks ago.  He states he was out mowing his lawn and not drinking quite as much water as he should have been.  He felt lightheaded when he came in.  He was taken to the ER.  He was given fluids.  He is doing much better.  He states that he restarted his blood pressure medications and blood pressures have been running normal since then.  He is trying to be more mindful when he is working outside of drinking enough water and adding some Gatorade as needed as well.  He has a history of stage III chronic kidney disease.  This been stable.    Patient also with history of prostate cancer.  Has been stable.    Patient also with gout history.  He is on allopurinol 100 mg daily.  Tolerating well.  No recent flareups.    Patient also with prediabetes.  Last A1c was 5.9%.  Not currently on any medications.

## 2024-06-24 NOTE — PROGRESS NOTES
Medicare Annual Wellness Visit    Cornel Stokes is here for Medicare AWV    Assessment & Plan   Medicare annual wellness visit, subsequent  -     [facility] Referral to ACP Clinical Specialist  Decreased hearing of both ears  -     External Referral to Audiology  Recommendations for Preventive Services Due: see orders and patient instructions/AVS.  Recommended screening schedule for the next 5-10 years is provided to the patient in written form: see Patient Instructions/AVS.     No follow-ups on file.     Subjective       Patient's complete Health Risk Assessment and screening values have been reviewed and are found in Flowsheets. The following problems were reviewed today and where indicated follow up appointments were made and/or referrals ordered.    Positive Risk Factor Screenings with Interventions:    Fall Risk:  Do you feel unsteady or are you worried about falling? : (!) yes  2 or more falls in past year?: no  Fall with injury in past year?: no     Interventions:    Reviewed medications, home hazards, visual acuity, and co-morbidities that can increase risk for falls            General HRA Questions:  Select all that apply: (!) Stress    Stress Interventions:  Patient declined any further interventions or treatment      Activity, Diet, and Weight:  On average, how many days per week do you engage in moderate to strenuous exercise (like a brisk walk)?: 0 days  On average, how many minutes do you engage in exercise at this level?: 0 min    Do you eat balanced/healthy meals regularly?: Yes    Body mass index is 24.11 kg/m².      Inactivity Interventions:  Patient declined any further interventions or treatment         Hearing Screen:  Do you or your family notice any trouble with your hearing that hasn't been managed with hearing aids?: (!) Yes    Interventions:  Referred to Audiology    Vision Screen:  Do you have difficulty driving, watching TV, or doing any of your daily activities because of your eyesight?:

## 2024-06-24 NOTE — PATIENT INSTRUCTIONS
where recognizing different colors is important, such as , electronics, or the .  How are vision tests done?  Visual acuity test   You cover one eye at a time.  You read aloud from a wall chart across the room.  You read aloud from a small card that you hold in your hand.  Refraction   You look into a special device.  The device puts lenses of different strengths in front of each eye to see how strong your glasses or contact lenses need to be.  Visual field tests   Your doctor may have you look through special machines.  Or your doctor may simply have you stare straight ahead while they move a finger into and out of your field of vision.  Color vision test   You look at pieces of printed test patterns in various colors. You say what number or symbol you see.  Your doctor may have you trace the number or symbol using a pointer.  How do these tests feel?  There is very little chance of having a problem from this test. If dilating drops are used for a vision test, they may make the eyes sting and cause a medicine taste in the mouth.  Follow-up care is a key part of your treatment and safety. Be sure to make and go to all appointments, and call your doctor if you are having problems. It's also a good idea to know your test results and keep a list of the medicines you take.  Where can you learn more?  Go to https://www.[employer].net/patientEd and enter G551 to learn more about \"Learning About Vision Tests.\"  Current as of: June 5, 2023  Content Version: 14.1  © 4847-4411 [employer].   Care instructions adapted under license by [facility]. If you have questions about a medical condition or this instruction, always ask your healthcare professional. [employer] disclaims any warranty or liability for your use of this information.           Advance Directives: Care Instructions  Overview  An advance directive is a legal way to state your wishes at the end of your life. It

## 2024-06-24 NOTE — TELEPHONE ENCOUNTER
Future Appointments    Encounter Information   Provider Department Appt Notes   6/24/2024 Ann Dickens DO [facility] 6 Month follow up   6/24/2024 Ann Dickens DO [facility] Medicare AWV     Past Visits    Date Provider Specialty Visit Type Primary Dx   03/27/2024 Ann Dickens DO Family Medicine Procedure visit Bilateral primary osteoarthritis of knee [M17.0 (ICD-10-CM)

## 2024-06-25 ENCOUNTER — CLINICAL DOCUMENTATION (OUTPATIENT)
Dept: SPIRITUAL SERVICES | Age: 84
End: 2024-06-25

## 2024-06-25 ASSESSMENT — ENCOUNTER SYMPTOMS
RHINORRHEA: 0
COUGH: 0
VOMITING: 0
ABDOMINAL PAIN: 0
WHEEZING: 0
NAUSEA: 0
SHORTNESS OF BREATH: 0
DIARRHEA: 0
EYE DISCHARGE: 0
SORE THROAT: 0

## 2024-06-25 NOTE — ACP (ADVANCE CARE PLANNING)
Advance Care Planning   Ambulatory ACP Specialist Patient Outreach    Date:  6/25/2024    ACP Specialist:  Maggie Alvarez LPN    Outreach call to patient in follow-up to ACP Specialist referral from:Ann Dickens DO    [x] PCP  [] Provider   [] Ambulatory Care Management [] Other     For:                  [x] Advance Directive Assistance              [] Complete Portable DNR order              [] Complete POST/POLST/MOST              [] Code Status Discussion             [] Discuss Goals of Care             [] Early ACP Decision-Making              [] Other (Specify)    Date Referral Received:6/24/24    Next Step:   [x] ACP scheduled conversation  [] Outreach again in one week               [x] Mail ACP Info Sheets  [x] Mail Advance Directive   [] Closing referral.  Routing closure to referring provider/staff and to ACP Specialist .    [] Closure letter mailed to patient with invitation to contact ACP Specialist if / when ready.   [] Other (Specify here):         [x] At this time, Healthcare Decision Maker Is:  Advance Care Planning   Healthcare Decision Maker:    Primary Decision Maker: DiegoMaryse ferrer - Spouse - [phone]          [] Primary agent named in scanned advance directive.    [x] Legal Next of Kin.     [] Unable to determine legal decision maker at this time.         Outreaches:       [x] 1st -  Date:  6/25/24               Intervention:  [x] Spoke with Patient   [] Left Voice mail [] Mail    [] MyChart  [] Other (Specify) :     Outcomes:Writer contacted patient on preferred phone ([phone]) to discuss ACP.  Patient is agreeable to a conversation with ACP Specialist Ailin Rose on Tuesday, July 9, 2024 at 3:00p.m.  A copy of [place] [employer] forms and ACP information sheets sent to patient's address on file with ACP Specialist and Coordinator's contact information included.       Thank you for this referral.

## 2024-07-08 ENCOUNTER — CLINICAL DOCUMENTATION (OUTPATIENT)
Dept: SPIRITUAL SERVICES | Age: 84
End: 2024-07-08

## 2024-07-08 NOTE — ACP (ADVANCE CARE PLANNING)
Advance Care Planning   Ambulatory ACP Specialist Patient Outreach    Date:  7/8/2024 07/09/2024 07/12/2024    ACP Specialist:  VIKTOR England    Outreach call to patient in follow-up to ACP Specialist referral from:Ann Dickens DO    [x] PCP  [] Provider   [] Ambulatory Care Management [] Other     For:                  [x] Advance Directive Assistance              [] Complete Portable DNR order              [] Complete POST/POLST/MOST              [] Code Status Discussion             [] Discuss Goals of Care             [] Early ACP Decision-Making              [] Other (Specify)    Date Referral Received:06/24/2024    Next Step:   [x] ACP scheduled conversation  [] Outreach again in one week               [] Email / Mail ACP Info Sheets  [] Email / Mail Advance Directive   [] Closing referral.  Routing closure to referring provider/staff and to ACP Specialist .    [] Closure letter mailed to patient with invitation to contact ACP Specialist if / when ready.   [x] Other (Specify here):  check in by end of week 7/12 re: receipt of packet     [x] At this time, Healthcare Decision Maker Is:         [] Primary agent named in scanned advance directive.    [x] Legal Next of Kin.     [] Unable to determine legal decision maker at this time.    Outreaches:          [x] 2nd -  Date:  07/08/2024               Intervention:  [] Spoke with Patient  [] Left Voice mail [] Email / Mail    [] MyChart  [x] Other (Specify) :   spoke with spouse Maryse HANSON on file            Outcomes:Placed reminder call to home/mobile re: ACP visit set for Tues July 9 at 3pm. Spouse answered and share she has not checked the mail today but so far--they have not received the ACP packet that was mailed to the home. Spouse plans to join the ACP visit/call. Will proceed with planned call tomorrow at 3pm with hopes they receive packet today.                 [x] 3rd -  Date:  07/09/2024              Intervention:  [x] Spoke with

## 2024-07-26 ENCOUNTER — CLINICAL DOCUMENTATION (OUTPATIENT)
Dept: SPIRITUAL SERVICES | Age: 84
End: 2024-07-26

## 2024-07-26 NOTE — ACP (ADVANCE CARE PLANNING)
them to try this.\"       [x] Yes   [] No   Educated Patient / Decision Maker regarding differences between Advance Directives and portable DNR orders.    Length of ACP Conversation in minutes:  40    Conversation Outcomes:  ACP discussion completed    Follow-up plan:    [] Schedule follow-up conversation to continue planning  [] Referred individual to Provider for additional questions/concerns   [] Advised patient/agent/surrogate to review completed ACP document and update if needed with changes in condition, patient preferences or care setting    [x] This note routed to one or more involved healthcare providers    Summary:  Met with pt and spouse to offer ACP support. They have received ACP packets and are reviewing them together. Answered questions and offered ACP education. Care preferences as outlined above; pt wishes to remain FULL CODE. Pt shared that he and his spouse will fill out ACP documents on their own and will get them notarized. Encouraged pt to provide copy for his medical record and pt said he will do this. Offered to update HCDM as pt mentioned he may list his daughter who lives locally as secondary (said his other daughter lives in [place] and wouldn't list her). Pt declined to update HCDM today and prefers to leave contact listed as just spouse. Pt had no other questions at this time. Confirmed pt has SW contact and encouraged pt to reach out with any additional ACP questions. Thanked pt and spouse for their time today.     The referral can be closed.

## 2024-12-11 RX ORDER — ALLOPURINOL 100 MG/1
100 TABLET ORAL DAILY
Qty: 90 TABLET | Refills: 3 | Status: SHIPPED | OUTPATIENT
Start: 2024-12-11

## 2024-12-17 ENCOUNTER — OFFICE VISIT (OUTPATIENT)
Age: 84
End: 2024-12-17
Payer: MEDICARE

## 2024-12-17 VITALS
DIASTOLIC BLOOD PRESSURE: 52 MMHG | SYSTOLIC BLOOD PRESSURE: 122 MMHG | TEMPERATURE: 97.8 F | HEIGHT: 70 IN | BODY MASS INDEX: 23.62 KG/M2 | HEART RATE: 72 BPM | WEIGHT: 165 LBS | OXYGEN SATURATION: 99 %

## 2024-12-17 DIAGNOSIS — N18.31 CHRONIC RENAL FAILURE, STAGE 3A (HCC): ICD-10-CM

## 2024-12-17 DIAGNOSIS — M17.0 BILATERAL PRIMARY OSTEOARTHRITIS OF KNEE: ICD-10-CM

## 2024-12-17 DIAGNOSIS — C61 PROSTATE CANCER (HCC): ICD-10-CM

## 2024-12-17 DIAGNOSIS — Z12.5 PROSTATE CANCER SCREENING: ICD-10-CM

## 2024-12-17 DIAGNOSIS — M10.9 GOUT, UNSPECIFIED CAUSE, UNSPECIFIED CHRONICITY, UNSPECIFIED SITE: ICD-10-CM

## 2024-12-17 DIAGNOSIS — R73.03 PREDIABETES: ICD-10-CM

## 2024-12-17 DIAGNOSIS — I10 ESSENTIAL HYPERTENSION: Primary | ICD-10-CM

## 2024-12-17 PROCEDURE — 99213 OFFICE O/P EST LOW 20 MIN: CPT | Performed by: STUDENT IN AN ORGANIZED HEALTH CARE EDUCATION/TRAINING PROGRAM

## 2024-12-17 RX ORDER — LATANOPROST 50 UG/ML
SOLUTION/ DROPS OPHTHALMIC
COMMUNITY
Start: 2024-12-03

## 2024-12-17 NOTE — PROGRESS NOTES
Subjective  Cornel Stokes, 84 y.o. male presents today with:  Chief Complaint   Patient presents with    6 Month Follow-Up    Hypertension     Does not check BP at home. Denies chest pains, heart palpitations, headaches, dizziness, SOB or edema.     Gout     Taking Allopurinol as directed. Feels this is controlling flares at this time.     Chronic Kidney Disease     Lab work done 12/9/24. Follows with nephrology.     Pre-Diabetes     A1c was 5.9 on 12/11/23.    Knee Pain     Continues to have knee pain. Feels pain is increasing. Would like to discuss.        History of Present Illness  The patient is an 84-year-old male here for a follow-up visit. He is accompanied by his wife.    He reports overall good health, with no recent episodes of gout. He is currently on allopurinol 100 mg for gout management.    His blood pressure is well-regulated with amlodipine 5 mg and benazepril 40 mg. However, he experienced a significant drop in blood pressure earlier this year. A similar episode occurred a few weeks ago, characterized by weakness and low blood pressure. He did not fall. His wife administered Gatorade, suspecting dehydration due to excessive sweating. His blood pressure was checked and it was low. The episode was short-lived, and he recovered after resting. He also reported visual disturbances, including blurriness and sensitivity to light, which have occurred intermittently in the past. He manages these symptoms by resting.    He experiences knee pain and occasional instability. Previous injections administered in March 2023 were ineffective, unlike those given in July 2022, which provided relief. He is not interested in knee replacement surgery and prefers to receive another injection in the spring, coinciding with the golf season.  He has no other concerns at this time.        Past Medical History:   Diagnosis Date    Adenomatous polyp     Allergic rhinitis     Arthritis     of the first and the second MP

## 2024-12-20 DIAGNOSIS — Z12.5 PROSTATE CANCER SCREENING: ICD-10-CM

## 2024-12-20 DIAGNOSIS — C61 PROSTATE CANCER (HCC): ICD-10-CM

## 2024-12-20 DIAGNOSIS — R73.03 PREDIABETES: ICD-10-CM

## 2024-12-20 LAB — PSA SERPL-MCNC: 3.06 NG/ML (ref 0–4)

## 2024-12-21 LAB
ESTIMATED AVERAGE GLUCOSE: 111 MG/DL
HBA1C MFR BLD: 5.5 % (ref 4–6)

## 2025-03-17 ENCOUNTER — PROCEDURE VISIT (OUTPATIENT)
Age: 85
End: 2025-03-17
Payer: MEDICARE

## 2025-03-17 VITALS
HEART RATE: 80 BPM | WEIGHT: 169 LBS | DIASTOLIC BLOOD PRESSURE: 60 MMHG | BODY MASS INDEX: 24.2 KG/M2 | TEMPERATURE: 97.8 F | SYSTOLIC BLOOD PRESSURE: 136 MMHG | HEIGHT: 70 IN | OXYGEN SATURATION: 99 %

## 2025-03-17 DIAGNOSIS — M17.0 BILATERAL PRIMARY OSTEOARTHRITIS OF KNEE: Primary | ICD-10-CM

## 2025-03-17 PROCEDURE — 20610 DRAIN/INJ JOINT/BURSA W/O US: CPT | Performed by: STUDENT IN AN ORGANIZED HEALTH CARE EDUCATION/TRAINING PROGRAM

## 2025-03-17 PROCEDURE — PBSHW PBB SHADOW CHARGE: Performed by: STUDENT IN AN ORGANIZED HEALTH CARE EDUCATION/TRAINING PROGRAM

## 2025-03-17 RX ORDER — LIDOCAINE HYDROCHLORIDE 10 MG/ML
2 INJECTION, SOLUTION EPIDURAL; INFILTRATION; INTRACAUDAL; PERINEURAL ONCE
Status: SHIPPED | OUTPATIENT
Start: 2025-03-17

## 2025-03-17 RX ORDER — LIDOCAINE HYDROCHLORIDE 10 MG/ML
2 INJECTION, SOLUTION INFILTRATION; PERINEURAL ONCE
Status: COMPLETED | OUTPATIENT
Start: 2025-03-17 | End: 2025-03-17

## 2025-03-17 RX ORDER — TRIAMCINOLONE ACETONIDE 40 MG/ML
40 INJECTION, SUSPENSION INTRA-ARTICULAR; INTRAMUSCULAR ONCE
Status: COMPLETED | OUTPATIENT
Start: 2025-03-17 | End: 2025-03-17

## 2025-03-17 RX ADMIN — LIDOCAINE HYDROCHLORIDE 2 ML: 10 INJECTION, SOLUTION INFILTRATION; PERINEURAL at 16:39

## 2025-03-17 RX ADMIN — LIDOCAINE HYDROCHLORIDE 2 ML: 10 INJECTION, SOLUTION INFILTRATION; PERINEURAL at 16:38

## 2025-03-17 RX ADMIN — TRIAMCINOLONE ACETONIDE 40 MG: 400 INJECTION, SUSPENSION INTRA-ARTICULAR; INTRAMUSCULAR at 16:29

## 2025-03-17 RX ADMIN — TRIAMCINOLONE ACETONIDE 40 MG: 400 INJECTION, SUSPENSION INTRA-ARTICULAR; INTRAMUSCULAR at 16:30

## 2025-03-17 SDOH — ECONOMIC STABILITY: FOOD INSECURITY: WITHIN THE PAST 12 MONTHS, YOU WORRIED THAT YOUR FOOD WOULD RUN OUT BEFORE YOU GOT MONEY TO BUY MORE.: NEVER TRUE

## 2025-03-17 SDOH — ECONOMIC STABILITY: FOOD INSECURITY: WITHIN THE PAST 12 MONTHS, THE FOOD YOU BOUGHT JUST DIDN'T LAST AND YOU DIDN'T HAVE MONEY TO GET MORE.: NEVER TRUE

## 2025-03-17 ASSESSMENT — PATIENT HEALTH QUESTIONNAIRE - PHQ9
2. FEELING DOWN, DEPRESSED OR HOPELESS: NOT AT ALL
SUM OF ALL RESPONSES TO PHQ QUESTIONS 1-9: 0
1. LITTLE INTEREST OR PLEASURE IN DOING THINGS: NOT AT ALL
SUM OF ALL RESPONSES TO PHQ QUESTIONS 1-9: 0

## 2025-03-17 NOTE — PROGRESS NOTES
Chief Complaint   Patient presents with    Procedure     Here today for bilateral knee injections. Consent for procedure signed.          Joint Injection Procedure Note    Indication: bilateral knee osteoarthritis   After having explained the potential risks and benefits of the right and left knee injections, the patient gave verbal and written consent to proceed. The patient's identifiers were confirmed with a timeout, including name and date of birth (and that no heat source or antibiotics were utilized with this procedure). The site was then confirmed and marked, and the area was prepped in aseptic fashion with Betadine. A 25gauge, 1.5 inch needle was directed into the above area. The injection was completed with 40 mg of Kenalog mixed with 3 cc of 1% Lidocaine. The patient tolerated the procedure without difficulty and was instructed on post-injection care. All questions answered       Ann Dickens DO  3/17/2025

## 2025-04-30 DIAGNOSIS — I10 ESSENTIAL HYPERTENSION: ICD-10-CM

## 2025-05-01 RX ORDER — BENAZEPRIL HYDROCHLORIDE 40 MG/1
40 TABLET ORAL DAILY
Qty: 90 TABLET | Refills: 3 | Status: SHIPPED | OUTPATIENT
Start: 2025-05-01

## 2025-05-01 RX ORDER — AMLODIPINE BESYLATE 5 MG/1
5 TABLET ORAL DAILY
Qty: 90 TABLET | Refills: 3 | Status: SHIPPED | OUTPATIENT
Start: 2025-05-01

## 2025-06-17 ENCOUNTER — OFFICE VISIT (OUTPATIENT)
Age: 85
End: 2025-06-17
Payer: MEDICARE

## 2025-06-17 VITALS
HEIGHT: 70 IN | DIASTOLIC BLOOD PRESSURE: 60 MMHG | SYSTOLIC BLOOD PRESSURE: 118 MMHG | WEIGHT: 169 LBS | BODY MASS INDEX: 24.2 KG/M2 | OXYGEN SATURATION: 98 % | HEART RATE: 50 BPM | TEMPERATURE: 97.7 F

## 2025-06-17 DIAGNOSIS — C61 PROSTATE CANCER (HCC): ICD-10-CM

## 2025-06-17 DIAGNOSIS — M17.0 BILATERAL PRIMARY OSTEOARTHRITIS OF KNEE: ICD-10-CM

## 2025-06-17 DIAGNOSIS — R73.03 PREDIABETES: ICD-10-CM

## 2025-06-17 DIAGNOSIS — N18.31 CHRONIC RENAL FAILURE, STAGE 3A (HCC): ICD-10-CM

## 2025-06-17 DIAGNOSIS — I10 ESSENTIAL HYPERTENSION: Primary | ICD-10-CM

## 2025-06-17 DIAGNOSIS — M10.9 ACUTE GOUT OF LEFT FOOT, UNSPECIFIED CAUSE: ICD-10-CM

## 2025-06-17 DIAGNOSIS — M19.90 ARTHRITIS: ICD-10-CM

## 2025-06-17 PROCEDURE — G2211 COMPLEX E/M VISIT ADD ON: HCPCS | Performed by: STUDENT IN AN ORGANIZED HEALTH CARE EDUCATION/TRAINING PROGRAM

## 2025-06-17 PROCEDURE — G8420 CALC BMI NORM PARAMETERS: HCPCS | Performed by: STUDENT IN AN ORGANIZED HEALTH CARE EDUCATION/TRAINING PROGRAM

## 2025-06-17 PROCEDURE — 1036F TOBACCO NON-USER: CPT | Performed by: STUDENT IN AN ORGANIZED HEALTH CARE EDUCATION/TRAINING PROGRAM

## 2025-06-17 PROCEDURE — 99213 OFFICE O/P EST LOW 20 MIN: CPT | Performed by: STUDENT IN AN ORGANIZED HEALTH CARE EDUCATION/TRAINING PROGRAM

## 2025-06-17 PROCEDURE — G8427 DOCREV CUR MEDS BY ELIG CLIN: HCPCS | Performed by: STUDENT IN AN ORGANIZED HEALTH CARE EDUCATION/TRAINING PROGRAM

## 2025-06-17 PROCEDURE — 1159F MED LIST DOCD IN RCRD: CPT | Performed by: STUDENT IN AN ORGANIZED HEALTH CARE EDUCATION/TRAINING PROGRAM

## 2025-06-17 PROCEDURE — 1123F ACP DISCUSS/DSCN MKR DOCD: CPT | Performed by: STUDENT IN AN ORGANIZED HEALTH CARE EDUCATION/TRAINING PROGRAM

## 2025-06-17 PROCEDURE — 99214 OFFICE O/P EST MOD 30 MIN: CPT | Performed by: STUDENT IN AN ORGANIZED HEALTH CARE EDUCATION/TRAINING PROGRAM

## 2025-06-17 PROCEDURE — 1160F RVW MEDS BY RX/DR IN RCRD: CPT | Performed by: STUDENT IN AN ORGANIZED HEALTH CARE EDUCATION/TRAINING PROGRAM

## 2025-06-17 NOTE — PROGRESS NOTES
Subjective  Cornel Stokes, 85 y.o. male presents today with:  Chief Complaint   Patient presents with    6 Month Follow-Up    Hypertension     Does not check BP at home. Denies chest pains, heart palpitations, headaches, dizziness, SOB or edema.     Gout     Has not had any recent flares. Is doing well at this time.     Chronic Kidney Disease     Labs done 6/9/25. Following with nephrology.     Pre-Diabetes     A1c was 5.5 on 12/20/24.     Knee Pain     Continues to have knee pain. Does not feel like knee injections helped. Would like to discuss further evaluation, since injections did not help.     Other     States he has seeing hematology/oncology for several years. States he does not feel like he needs to see them any longer, but would like to discuss w/PCP if this is okay for him to do.        History of Present Illness  The patient is an 85-year-old male here for follow-up, accompanied by his wife.    He reports no improvement in his bilateral knee condition and expresses a desire to consult with an orthopedic specialist. He has not identified a specific surgeon for this consultation. He did recently have knee injections which did not help like previously.     His blood pressure is well-managed with his current medication regimen of benazepril 40 mg and amlodipine 5 mg. He has not experienced any recent gout flare-ups, indicating that the allopurinol 100 mg is effectively managing this condition.    He has been under the care of a hematologist, with annual visits primarily for blood work. He is not receiving any medications from the hematologist. He recently had a consultation with Dr. Nelson, during which lab tests were conducted. No other concerns at this time.       Past Medical History:   Diagnosis Date    Adenomatous polyp     Allergic rhinitis     Arthritis     of the first and the second MP joints of the Right foot     Basal cell carcinoma of nose 04/28/2015    Chronic kidney disease, stage III

## 2025-06-23 ENCOUNTER — OFFICE VISIT (OUTPATIENT)
Age: 85
End: 2025-06-23
Payer: MEDICARE

## 2025-06-23 ENCOUNTER — HOSPITAL ENCOUNTER (OUTPATIENT)
Dept: ORTHOPEDIC SURGERY | Age: 85
Discharge: HOME | End: 2025-06-25
Payer: MEDICARE

## 2025-06-23 VITALS
BODY MASS INDEX: 24.2 KG/M2 | DIASTOLIC BLOOD PRESSURE: 58 MMHG | WEIGHT: 169 LBS | RESPIRATION RATE: 16 BRPM | HEIGHT: 70 IN | OXYGEN SATURATION: 98 % | SYSTOLIC BLOOD PRESSURE: 124 MMHG | TEMPERATURE: 97.9 F | HEART RATE: 67 BPM

## 2025-06-23 DIAGNOSIS — M17.0 PRIMARY OSTEOARTHRITIS OF BOTH KNEES: Primary | ICD-10-CM

## 2025-06-23 DIAGNOSIS — M19.90 ARTHRITIS: ICD-10-CM

## 2025-06-23 PROCEDURE — 99204 OFFICE O/P NEW MOD 45 MIN: CPT | Performed by: STUDENT IN AN ORGANIZED HEALTH CARE EDUCATION/TRAINING PROGRAM

## 2025-06-23 PROCEDURE — 20610 DRAIN/INJ JOINT/BURSA W/O US: CPT | Performed by: STUDENT IN AN ORGANIZED HEALTH CARE EDUCATION/TRAINING PROGRAM

## 2025-06-23 PROCEDURE — 73564 X-RAY EXAM KNEE 4 OR MORE: CPT

## 2025-06-23 PROCEDURE — G8420 CALC BMI NORM PARAMETERS: HCPCS | Performed by: STUDENT IN AN ORGANIZED HEALTH CARE EDUCATION/TRAINING PROGRAM

## 2025-06-23 PROCEDURE — G8427 DOCREV CUR MEDS BY ELIG CLIN: HCPCS | Performed by: STUDENT IN AN ORGANIZED HEALTH CARE EDUCATION/TRAINING PROGRAM

## 2025-06-23 PROCEDURE — 1123F ACP DISCUSS/DSCN MKR DOCD: CPT | Performed by: STUDENT IN AN ORGANIZED HEALTH CARE EDUCATION/TRAINING PROGRAM

## 2025-06-23 PROCEDURE — 1125F AMNT PAIN NOTED PAIN PRSNT: CPT | Performed by: STUDENT IN AN ORGANIZED HEALTH CARE EDUCATION/TRAINING PROGRAM

## 2025-06-23 PROCEDURE — 1036F TOBACCO NON-USER: CPT | Performed by: STUDENT IN AN ORGANIZED HEALTH CARE EDUCATION/TRAINING PROGRAM

## 2025-06-23 PROCEDURE — 1159F MED LIST DOCD IN RCRD: CPT | Performed by: STUDENT IN AN ORGANIZED HEALTH CARE EDUCATION/TRAINING PROGRAM

## 2025-06-23 PROCEDURE — 99203 OFFICE O/P NEW LOW 30 MIN: CPT | Performed by: STUDENT IN AN ORGANIZED HEALTH CARE EDUCATION/TRAINING PROGRAM

## 2025-06-23 PROCEDURE — PBSHW PBB SHADOW CHARGE: Performed by: STUDENT IN AN ORGANIZED HEALTH CARE EDUCATION/TRAINING PROGRAM

## 2025-06-23 RX ADMIN — Medication 60 MG: at 11:48

## 2025-06-23 RX ADMIN — Medication 60 MG: at 11:47
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
## 2025-06-30 ENCOUNTER — TELEPHONE (OUTPATIENT)
Age: 85
End: 2025-06-30

## 2025-06-30 NOTE — TELEPHONE ENCOUNTER
----- Message from ZACK CLARK MA sent at 6/23/2025 11:31 AM EDT -----  Regarding: Post Inejction Call- Stern  Patient had Bilateral Durolane injections on 6/23/2025 with Dr. Stern    Call to check progress and pain level.

## 2025-06-30 NOTE — TELEPHONE ENCOUNTER
Patient called back in regards to his injections. Patient had bilateral Durolane injections to both knees.   Patient stated that the left knee is still problematic , still giving out after a few steps. Right knee is fine at this point.   Patient did notice rashes on both needs immediately after the injections about 24 hours after. Stating they were red and itchy. Patient did take some Benedryl to help calm this down on his own. At this time, the rashes are slowly getting better.   Patient stated he would call us if he would like to discuss further steps.     I stated that I would let Dr. Stern know and that if he has any concern I would call him back.   Patient verbalized understanding.

## (undated) DEVICE — SINGLE PORT MANIFOLD: Brand: NEPTUNE 2

## (undated) DEVICE — ADAPTER FLSH PMP FLD MGMT GI IRRIG OFP 2 DISPOSABLE

## (undated) DEVICE — SNARE ENDOSCP AD L240CM LOOP W10MM SHTH DIA2.4MM RND INSUL

## (undated) DEVICE — TRAP POLYP BALEEN

## (undated) DEVICE — TUBING, SUCTION, 1/4" X 10', STRAIGHT: Brand: MEDLINE

## (undated) DEVICE — Device: Brand: ENDO SMARTCAP

## (undated) DEVICE — PATIENT RETURN ELECTRODE, SINGLE-USE, NON CONTACT QUALITY MONITORING, ADULT, WITH 9 FT (2.7 M) CORD, FOR PATIENTS WEIGHING OVER 33LBS. (15KG): Brand: MEGADYNE

## (undated) DEVICE — TUBE SET 96 MM 64 MM H2O PERISTALTIC STD AUX CHANNEL

## (undated) DEVICE — BRUSH ENDO CLN L90.5IN SHTH DIA1.7MM BRIST DIA5-7MM 2-6MM

## (undated) DEVICE — ENDO CARRY-ON PROCEDURE KIT: Brand: ENDO CARRY-ON PROCEDURE KIT